# Patient Record
Sex: FEMALE | Race: BLACK OR AFRICAN AMERICAN | NOT HISPANIC OR LATINO | URBAN - METROPOLITAN AREA
[De-identification: names, ages, dates, MRNs, and addresses within clinical notes are randomized per-mention and may not be internally consistent; named-entity substitution may affect disease eponyms.]

---

## 2017-04-14 ENCOUNTER — HOSPITAL ENCOUNTER (EMERGENCY)
Facility: OTHER | Age: 42
Discharge: HOME | End: 2017-04-14
Attending: EMERGENCY MEDICINE
Payer: COMMERCIAL

## 2017-04-14 VITALS
BODY MASS INDEX: 39.65 KG/M2 | TEMPERATURE: 98 F | HEIGHT: 61 IN | HEART RATE: 74 BPM | DIASTOLIC BLOOD PRESSURE: 70 MMHG | OXYGEN SATURATION: 95 % | SYSTOLIC BLOOD PRESSURE: 119 MMHG | RESPIRATION RATE: 14 BRPM | WEIGHT: 210 LBS

## 2017-04-14 DIAGNOSIS — M25.562 ACUTE PAIN OF LEFT KNEE: Primary | ICD-10-CM

## 2017-04-14 LAB
B-HCG UR QL: NEGATIVE
CTP QC/QA: YES

## 2017-04-14 PROCEDURE — 99283 EMERGENCY DEPT VISIT LOW MDM: CPT | Mod: 25

## 2017-04-14 PROCEDURE — 81025 URINE PREGNANCY TEST: CPT | Performed by: EMERGENCY MEDICINE

## 2017-04-14 PROCEDURE — 96372 THER/PROPH/DIAG INJ SC/IM: CPT

## 2017-04-14 PROCEDURE — 63600175 PHARM REV CODE 636 W HCPCS: Performed by: EMERGENCY MEDICINE

## 2017-04-14 RX ORDER — KETOROLAC TROMETHAMINE 30 MG/ML
60 INJECTION, SOLUTION INTRAMUSCULAR; INTRAVENOUS
Status: COMPLETED | OUTPATIENT
Start: 2017-04-14 | End: 2017-04-14

## 2017-04-14 RX ORDER — OXAPROZIN 600 MG/1
1200 TABLET, FILM COATED ORAL DAILY
Qty: 14 TABLET | Refills: 0 | Status: SHIPPED | OUTPATIENT
Start: 2017-04-14 | End: 2017-04-21

## 2017-04-14 RX ADMIN — KETOROLAC TROMETHAMINE 60 MG: 30 INJECTION, SOLUTION INTRAMUSCULAR at 08:04

## 2017-04-14 NOTE — ED AVS SNAPSHOT
[facility]  [address]  [facility] [address]               Alexia Fraire   2017  7:39 PM   ED    Description:  Female : 1975   Department:  [facility]           Your Care was Coordinated By:     Provider Role From To    Jessica Ramirez MD Attending Provider 17 1940 --      Reason for Visit     Leg Pain           Diagnoses this Visit        Comments    Acute pain of left knee    -  Primary       ED Disposition     None           To Do List           Follow-up Information     Follow up with [facility]. Schedule an appointment as soon as possible for a visit in 1 week.    Specialty:  Internal Medicine    Why:  if pain persists    Contact information:    [address]  [facility] [address] [phone]    Additional information:    [facility], 8th Floor, [address]   Please park in [facility].       These Medications        Disp Refills Start End    oxaprozin (DAYPRO) 600 mg tablet 14 tablet 0 2017    Take 2 tablets (1,200 mg total) by mouth once daily. - Oral      [facility]     [facility] Nurse Care Line -  Assistance  Unless otherwise directed by your provider, please contact [facility], our nurse care line that is available for  assistance.     Registered nurses in the [facility] provide: appointment scheduling, clinical advisement, health education, and other advisory services.  Call: [phone] (toll free)               Medications           Message regarding Medications     Verify the changes and/or additions to your medication regime listed below are the same as discussed with your clinician today.  If any of these changes or additions are incorrect, please notify your healthcare provider.        START taking these NEW medications        Refills    oxaprozin (DAYPRO) 600 mg tablet 0    Sig: Take 2 tablets (1,200 mg  "total) by mouth once daily.    Class: Print    Route: Oral      These medications were administered today        Dose Freq    ketorolac injection 60 mg 60 mg ED 1 Time    Sig: Inject 60 mg into the muscle ED 1 Time.    Class: Normal    Route: Intramuscular           Verify that the below list of medications is an accurate representation of the medications you are currently taking.  If none reported, the list may be blank. If incorrect, please contact your healthcare provider. Carry this list with you in case of emergency.           Current Medications     acetaminophen (TYLENOL) 500 MG tablet Take 500 mg by mouth once daily.    ketorolac injection 60 mg Inject 60 mg into the muscle ED 1 Time.    oxaprozin (DAYPRO) 600 mg tablet Take 2 tablets (1,200 mg total) by mouth once daily.           Clinical Reference Information           Your Vitals Were     BP Pulse Temp Resp Height Weight    127/80 79 97.9 °F (36.6 °C) (Oral) 18 5' 1" (1.549 m) 95.3 kg (210 lb)    Last Period SpO2 BMI          03/22/2017 99% 39.68 kg/m2        Allergies as of 4/14/2017     No Known Allergies      Immunizations Administered on Date of Encounter - 4/14/2017     None      ED Micro, Lab, POCT     Start Ordered       Status Ordering Provider    04/14/17 1918 04/14/17 1918  POCT urine pregnancy  Once      Final result       ED Imaging Orders     None        Discharge Instructions         Reducing Knee Pain and Swelling    Many treatments can help reduce pain and swelling in your knee. Your healthcare provider or physical therapist may suggest one or more of the following treatments:  · Icing your knee helps reduce swelling. You may be asked to ice your knee once a day or more. Apply ice for about 15 to 20 minutes at a time, with at least 40 minutes between sessions. Always keep a towel between the ice and your skin.   · Keeping your leg raised above your heart helps excess fluid flow out of your knee joint. This reduces swelling.  · Compression " means wrapping an elastic bandage or neoprene sleeve snugly around your knees. This keeps fluid from collecting in your knee joint.  · Electrical stimulation, done by a physical therapist or , can help reduce excess fluid in your knee joint.  · Anti-inflammatory medicines may be prescribed by your healthcare provider. You may take pills or receive injections in your knee.  · Isometric (christopher) exercises strengthen the muscles that support your knee joint. They also help reduce excess fluid in your knee.  · Massage helps fluid drain away from your knee.  Date Last Reviewed: 10/13/2015  © 5215-5068 [employer]. [address] [place] [address]. All rights reserved. This information is not intended as a substitute for professional medical care. Always follow your healthcare professional's instructions.          Knee Pain of Uncertain Cause    There are several common causes for knee pain. These can include:  · A sprain of the ligaments that support the joint  · An injury to the cartilage lining of the joint  · Arthritis from wear-and-tear or inflammation  There are other causes as well. There may also be swelling, reduced movement of the knee joint, and pain with walking. A definite diagnosis will still need to be made. If your symptoms do not improve, further follow-up and testing may be needed.  Home care  · Stay off the injured leg as much as possible until pain improves.  · Apply an ice pack over the injured area for 15 to 20 minutes every 3 to 6 hours. You should do this for the first 24 to 48 hours. You can make an ice pack by filling a plastic bag that seals at the top with ice cubes and then wrapping it with a thin towel. Continue to use ice packs for relief of pain and swelling as needed. As the ice melts, be careful to avoid getting your wrap, splint, or cast wet. After 48 hours, apply heat (warm shower or warm bath) for 15 to 20 minutes several times a day, or  alternate ice and heat. If you have to wear a hook-and-loop knee brace, you can open it to apply the ice pack, or heat, directly to the knee. Never put ice directly on the skin. Always wrap the ice in a towel or other type of cloth.  · You may use over-the-counter pain medicine to control pain, unless another pain medicine was prescribed. If you have chronic liver or kidney disease or ever had a stomach ulcer or GI bleeding, talk with your healthcare provider using these medicines.  · If crutches or a walker have been recommended, do not put weight on the injured leg until you can do so without pain. Check with your healthcare provider before returning to sports or full work duties.  · If you have a hook-and-loop knee brace, you can remove it to bathe and sleep, unless told otherwise.  Follow-up care  Follow up with your healthcare provider as advised. This is usually within 1-2 weeks.  If X-rays were taken, you will be told of any new findings that may affect your care.  Call 911  Call 911 if you have:  · Shortness of breath  · Chest pain  When to seek medical advice  Call your healthcare provider right away if any of these occur:  · Toes or foot becomes swollen, cold, blue, numb, or tingly  · Pain or swelling spreads over the knee or calf  · Warmth or redness appears over the knee or calf  · Other joints become painful  · Rash appears  · Fever of 100.4°F (38°C) or above lasting for 24 to 48 hours  Date Last Reviewed: 11/23/2015  © 0790-9917 [employer]. [address] [place] [address], [place] [address]. All rights reserved. This information is not intended as a substitute for professional medical care. Always follow your healthcare professional's instructions.          [facility] Sign-Up     Activating your [facility] account is as easy as 1-2-3!     1) Visit [email or web address], select Sign Up Now, enter this activation code and your date of birth, then select Next.  [email or web address]  Expires: 5/29/2017  7:51 PM       2) Create a username and password to use when you visit [facility] in the future and select a security question in case you lose your password and select Next.    3) Enter your e-mail address and click Sign Up!    Additional Information  If you have questions, please e-mail [email or web address] or call [phone] to talk to our [facility] staff. Remember, [facility] is NOT to be used for urgent needs. For medical emergencies, dial 911.          [facility] complies with applicable Federal civil rights laws and does not discriminate on the basis of race, color, national origin, age, disability, or sex.        Language Assistance Services     ATTENTION: Language assistance services are available, free of charge. Please call [phone].      ATENCIÓN: Si habla aisha, tiene a khan disposición servicios gratuitos de asistencia lingüística. Llame al [phone].     CHÚ Ý: N?u b?n nói Ti?ng Vi?t, có các d?ch v? h? tr? ngôn ng? mi?n phí dành cho b?n. G?i s? [phone].

## 2017-04-15 NOTE — DISCHARGE INSTRUCTIONS
Reducing Knee Pain and Swelling    Many treatments can help reduce pain and swelling in your knee. Your healthcare provider or physical therapist may suggest one or more of the following treatments:  · Icing your knee helps reduce swelling. You may be asked to ice your knee once a day or more. Apply ice for about 15 to 20 minutes at a time, with at least 40 minutes between sessions. Always keep a towel between the ice and your skin.   · Keeping your leg raised above your heart helps excess fluid flow out of your knee joint. This reduces swelling.  · Compression means wrapping an elastic bandage or neoprene sleeve snugly around your knees. This keeps fluid from collecting in your knee joint.  · Electrical stimulation, done by a physical therapist or , can help reduce excess fluid in your knee joint.  · Anti-inflammatory medicines may be prescribed by your healthcare provider. You may take pills or receive injections in your knee.  · Isometric (christopher) exercises strengthen the muscles that support your knee joint. They also help reduce excess fluid in your knee.  · Massage helps fluid drain away from your knee.  Date Last Reviewed: 10/13/2015  © 0406-6888 [employer]. [address] [place] [address]. All rights reserved. This information is not intended as a substitute for professional medical care. Always follow your healthcare professional's instructions.          Knee Pain of Uncertain Cause    There are several common causes for knee pain. These can include:  · A sprain of the ligaments that support the joint  · An injury to the cartilage lining of the joint  · Arthritis from wear-and-tear or inflammation  There are other causes as well. There may also be swelling, reduced movement of the knee joint, and pain with walking. A definite diagnosis will still need to be made. If your symptoms do not improve, further follow-up and testing may be needed.  Home care  · Stay  off the injured leg as much as possible until pain improves.  · Apply an ice pack over the injured area for 15 to 20 minutes every 3 to 6 hours. You should do this for the first 24 to 48 hours. You can make an ice pack by filling a plastic bag that seals at the top with ice cubes and then wrapping it with a thin towel. Continue to use ice packs for relief of pain and swelling as needed. As the ice melts, be careful to avoid getting your wrap, splint, or cast wet. After 48 hours, apply heat (warm shower or warm bath) for 15 to 20 minutes several times a day, or alternate ice and heat. If you have to wear a hook-and-loop knee brace, you can open it to apply the ice pack, or heat, directly to the knee. Never put ice directly on the skin. Always wrap the ice in a towel or other type of cloth.  · You may use over-the-counter pain medicine to control pain, unless another pain medicine was prescribed. If you have chronic liver or kidney disease or ever had a stomach ulcer or GI bleeding, talk with your healthcare provider using these medicines.  · If crutches or a walker have been recommended, do not put weight on the injured leg until you can do so without pain. Check with your healthcare provider before returning to sports or full work duties.  · If you have a hook-and-loop knee brace, you can remove it to bathe and sleep, unless told otherwise.  Follow-up care  Follow up with your healthcare provider as advised. This is usually within 1-2 weeks.  If X-rays were taken, you will be told of any new findings that may affect your care.  Call 911  Call 911 if you have:  · Shortness of breath  · Chest pain  When to seek medical advice  Call your healthcare provider right away if any of these occur:  · Toes or foot becomes swollen, cold, blue, numb, or tingly  · Pain or swelling spreads over the knee or calf  · Warmth or redness appears over the knee or calf  · Other joints become painful  · Rash appears  · Fever of 100.4°F  (38°C) or above lasting for 24 to 48 hours  Date Last Reviewed: 11/23/2015  © 8224-1152 [employer]. [address] [place] [address], [place] [address]. All rights reserved. This information is not intended as a substitute for professional medical care. Always follow your healthcare professional's instructions.

## 2017-04-15 NOTE — ED TRIAGE NOTES
Pt reports to ED c/o 9/10 constant left knee pain x 1 week radiating to LLE unrelieved with advil and aleve; denies recent fall or trauma. Pt reports pain worsening with movement and ambulation, ROM intact, pt ambulatory to intake room. Denies numbness/tingling, fevers, chills, N/V/D, dysuria/hematuria, no swelling noted.

## 2017-04-15 NOTE — ED PROVIDER NOTES
"Encounter Date: 2017    SCRIBE #1 NOTE: I, Yenifer Cobian, am scribing for, and in the presence of,  Dr. Ramirez. I have scribed the entire note.       History     Chief Complaint   Patient presents with    Leg Pain     c/o left leg pain x 2 days. Pt states "It hurts 'cause I work". "My knee been hurting me a long time but it been getting worse." Denies trauma     Review of patient's allergies indicates:  No Known Allergies  HPI Comments: Time seen by provider: 7:45 PM    This is a 41 y.o. female who presents with complaint of left knee pain. She reports onset of symptoms was about 1 week ago. The patient describes the pain as shooting and constant that radiates down the leg. She denies any associated swelling, redness, open wounds, numbness, tingling or weakness in the legs. The patient states the pain worsens with staying still. She denies any recent trauma or injury to the knee. 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 She states she has been using Aleve with no improvement in symptoms.    The history is provided by the patient.     History reviewed. No pertinent past medical history.  Past Surgical History:   Procedure Laterality Date     SECTION, LOW TRANSVERSE       History reviewed. No pertinent family history.  Social History   Substance Use Topics    Smoking status: Never Smoker    Smokeless tobacco: None    Alcohol use No     Review of Systems   Constitutional: Negative for chills and fever.   HENT: Negative for congestion and sore throat.    Eyes: Negative for redness and visual disturbance.   Respiratory: Negative for cough and shortness of breath.    Cardiovascular: Negative for chest pain and palpitations.   Gastrointestinal: Negative for abdominal pain, diarrhea, nausea and vomiting.   Genitourinary: Negative for dysuria.   Musculoskeletal: Negative for back pain.        Left knee pain   Skin: Negative for rash.   Neurological: Negative for weakness " and headaches.   Psychiatric/Behavioral: Negative for confusion.       Physical Exam   Initial Vitals   BP Pulse Resp Temp SpO2   04/14/17 1915 04/14/17 1915 04/14/17 1915 04/14/17 1915 04/14/17 1915   127/80 79 18 97.9 °F (36.6 °C) 99 %     Physical Exam    Nursing note and vitals reviewed.  Constitutional: She appears well-developed and well-nourished. She is not diaphoretic. No distress.   HENT:   Head: Normocephalic and atraumatic.   Right Ear: External ear normal.   Left Ear: External ear normal.   Eyes: Conjunctivae and EOM are normal.   Neck: Normal range of motion. Neck supple. No JVD present.   Cardiovascular: Normal rate, regular rhythm and normal heart sounds. Exam reveals no gallop and no friction rub.    No murmur heard.  Pulmonary/Chest: Breath sounds normal. She has no wheezes. She has no rhonchi. She has no rales.   Musculoskeletal: Normal range of motion. She exhibits no edema or tenderness.   LLE: Small anterior patellar effusion. No bony tenderness to palpation. No erythema or warmth.    Neurological: She is alert and oriented to person, place, and time. She has normal strength.   Skin: Skin is warm and dry. No rash noted.         ED Course   Procedures  Labs Reviewed   POCT URINE PREGNANCY                Additional MDM:   Comments: 42 y/o female with c/o atraumatic left knee pain.  Small patellar effusion on exam.  Pt counseled on RICE and was given a RX for daypro and instructed to f/u with her PCP in 1 week for re-evaluation..          Scribe Attestation:   Scribe #1: I performed the above scribed service and the documentation accurately describes the services I performed. I attest to the accuracy of the note.    Attending Attestation:           Physician Attestation for Scribe:  Physician Attestation Statement for Scribe #1: I, Dr. Ramirez, reviewed documentation, as scribed by Yenifer Cobian in my presence, and it is both accurate and complete.                 ED Course     Clinical  Impression:     1. Acute pain of left knee              Jessica Ramirez MD  04/14/17 8932

## 2018-06-26 ENCOUNTER — CLINICAL SUPPORT (OUTPATIENT)
Dept: URGENT CARE | Facility: CLINIC | Age: 43
End: 2018-06-26

## 2018-06-26 DIAGNOSIS — Z02.1 PHYSICAL EXAM, PRE-EMPLOYMENT: Primary | ICD-10-CM

## 2018-06-26 PROCEDURE — 99499 UNLISTED E&M SERVICE: CPT | Mod: S$GLB,,, | Performed by: PREVENTIVE MEDICINE

## 2018-06-26 PROCEDURE — 80305 DRUG TEST PRSMV DIR OPT OBS: CPT | Mod: S$GLB,,, | Performed by: PREVENTIVE MEDICINE

## 2018-10-05 ENCOUNTER — OFFICE VISIT (OUTPATIENT)
Dept: URGENT CARE | Facility: CLINIC | Age: 43
End: 2018-10-05
Payer: OTHER MISCELLANEOUS

## 2018-10-05 ENCOUNTER — TELEPHONE (OUTPATIENT)
Dept: URGENT CARE | Facility: CLINIC | Age: 43
End: 2018-10-05

## 2018-10-05 VITALS
OXYGEN SATURATION: 100 % | DIASTOLIC BLOOD PRESSURE: 85 MMHG | BODY MASS INDEX: 41.54 KG/M2 | TEMPERATURE: 98 F | SYSTOLIC BLOOD PRESSURE: 114 MMHG | WEIGHT: 220 LBS | RESPIRATION RATE: 18 BRPM | HEIGHT: 61 IN | HEART RATE: 67 BPM
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
## 2018-10-05 LAB
CTP QC/QA: YES
POC 5 PANEL DRUG SCREEN: NEGATIVE

## 2018-10-05 PROCEDURE — 80305 DRUG TEST PRSMV DIR OPT OBS: CPT | Mod: QW,S$GLB,, | Performed by: NURSE PRACTITIONER

## 2018-10-05 PROCEDURE — 99203 OFFICE O/P NEW LOW 30 MIN: CPT | Mod: S$GLB,,, | Performed by: NURSE PRACTITIONER

## 2018-10-05 NOTE — PROGRESS NOTES
"Subjective:       Patient ID: Alexia Fraire is a 42 y.o. female.    Vitals:  height is 5' 1" (1.549 m) and weight is 99.8 kg (220 lb). Her temperature is 98.3 °F (36.8 °C). Her blood pressure is 114/85 and her pulse is 67. Her respiration is 18 and oxygen saturation is 100%.     Chief Complaint: Fall    Patient presents with c/o slip and fall on marble floor while walking in to room to clean.  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  She has no complaints.  States that this occurred on 9/29/18 and that she was instructed by her work to get cleared to return.  She has no neck or back pain.  She did not hit her head or pass out.  Denies numbness, tingling, weakness, or loss of bowel/bladder control.  No headache or dizziness.  She reports that she did not miss any work s/t to this incident.      Fall   The accident occurred 5 to 7 days ago. The fall occurred while walking. She fell from a height of 3 to 5 ft. She landed on hard floor. There was no blood loss. The point of impact was the right shoulder and right hip. The pain is at a severity of 0/10. The patient is experiencing no pain. Exacerbated by: nothing  Pertinent negatives include no abdominal pain, fever, headaches, nausea or vomiting. She has tried nothing for the symptoms.     Review of Systems   Constitution: Negative for chills and fever.   HENT: Negative for sore throat.    Eyes: Negative for blurred vision.   Cardiovascular: Negative for chest pain.   Respiratory: Negative for shortness of breath.    Skin: Negative for rash.   Musculoskeletal: Positive for falls. Negative for back pain, joint pain, neck pain and stiffness.   Gastrointestinal: Negative for abdominal pain, diarrhea, nausea and vomiting.   Neurological: Negative for dizziness and headaches.   Psychiatric/Behavioral: The patient is not nervous/anxious.        Objective:      Physical Exam   Constitutional: She is oriented to person, place, and time. Vital signs are " normal. She appears well-developed and well-nourished. She is active and cooperative. No distress.   HENT:   Head: Normocephalic and atraumatic.   Nose: Nose normal.   Mouth/Throat: Oropharynx is clear and moist and mucous membranes are normal.   Eyes: Conjunctivae, EOM and lids are normal. Pupils are equal, round, and reactive to light.   Neck: Trachea normal, normal range of motion, full passive range of motion without pain and phonation normal. Neck supple. No spinous process tenderness and no muscular tenderness present. Normal range of motion present.   Cardiovascular: Normal rate, regular rhythm, normal heart sounds, intact distal pulses and normal pulses.   Pulmonary/Chest: Effort normal and breath sounds normal.   Abdominal: Soft. Normal appearance and bowel sounds are normal. She exhibits no abdominal bruit, no pulsatile midline mass and no mass.   Musculoskeletal: She exhibits no edema or deformity.        Right shoulder: Normal.        Right hip: Normal.        Cervical back: Normal.        Thoracic back: Normal.        Lumbar back: Normal.   Neurological: She is alert and oriented to person, place, and time. She has normal strength and normal reflexes. She displays no atrophy and no tremor. No cranial nerve deficit or sensory deficit. She exhibits normal muscle tone. She displays a negative Romberg sign. Coordination and gait normal.   Skin: Skin is warm, dry and intact. She is not diaphoretic.   Psychiatric: She has a normal mood and affect. Her speech is normal and behavior is normal. Judgment and thought content normal. Cognition and memory are normal.   Nursing note and vitals reviewed.      Assessment:       1. Fall, initial encounter        Plan:         Fall, initial encounter  -     POCT Rapid Drug Screen 5 Panel      Patient Instructions     You are cleared to return to work.  Follow up at one of our [facility] facilities if you begin to have any symptoms.    Preventing Falls: How to Prepare and  What to Do    Falling is not something you want to think about. But it can make a big difference to plan ahead. If you're prepared, you'll know how to get help. And you'll be less likely to panic if you fall. This means you'll be able to do what's needed to get help right away.  How to prepare  · Have someone check on you daily.  · Keep a list of emergency numbers near the phone.  · Always have a way to call for help. Keep a cell phone with you at all times. Or talk with your healthcare provider about how to set up a home monitoring service. This involves wearing a small device around your neck or wrist. If you fall, you can press the button on the device. This alerts emergency responders.  · Talk with your healthcare provider about an exercise program that's right for you. Regular exercise may reduce the risk of falling and the risk for injury related to a fall.  · It's important to have good lighting in your home. Avoid using throw rugs, because they can raise your risk of tripping and falling. Add grab bars in the bathroom to help reduce the risk of falling. Small changes can make your home safer. Talk with your healthcare provider about making your home safer.  What to do if you fall  Above all, try to stay calm:  · If you start to fall, try to relax your body. This will reduce the impact of the fall.  · After you fall, press your monitor button, or phone for help.  · Don't rush to get up. First, make sure you're not hurt.  · Roll onto your side, then crawl to a chair. Pull yourself up onto the chair slowly.  · You should be checked if you struck your head, lost consciousness, were confused afterward, or have any other concerns for injury.  · Be sure to tell your doctor that you fell.  A note to family and friends  If you're with a loved one when he or she starts to fall, don't try to stop the fall. Ease the person to the floor carefully, so neither of you gets hurt. Don't leave the person alone. And don't try  to move him or her. Put a pillow under his or her head. Check for injuries. If help is needed right away, be sure to call 911.   Date Last Reviewed: 6/13/2015  © 6100-1593 [employer]. [address] [place] [address], [place] [address]. All rights reserved. This information is not intended as a substitute for professional medical care. Always follow your healthcare professional's instructions.

## 2018-10-05 NOTE — PATIENT INSTRUCTIONS
You are cleared to return to work.  Follow up at one of our [facility] facilities if you begin to have any symptoms.    Preventing Falls: How to Prepare and What to Do    Falling is not something you want to think about. But it can make a big difference to plan ahead. If you're prepared, you'll know how to get help. And you'll be less likely to panic if you fall. This means you'll be able to do what's needed to get help right away.  How to prepare  · Have someone check on you daily.  · Keep a list of emergency numbers near the phone.  · Always have a way to call for help. Keep a cell phone with you at all times. Or talk with your healthcare provider about how to set up a home monitoring service. This involves wearing a small device around your neck or wrist. If you fall, you can press the button on the device. This alerts emergency responders.  · Talk with your healthcare provider about an exercise program that's right for you. Regular exercise may reduce the risk of falling and the risk for injury related to a fall.  · It's important to have good lighting in your home. Avoid using throw rugs, because they can raise your risk of tripping and falling. Add grab bars in the bathroom to help reduce the risk of falling. Small changes can make your home safer. Talk with your healthcare provider about making your home safer.  What to do if you fall  Above all, try to stay calm:  · If you start to fall, try to relax your body. This will reduce the impact of the fall.  · After you fall, press your monitor button, or phone for help.  · Don't rush to get up. First, make sure you're not hurt.  · Roll onto your side, then crawl to a chair. Pull yourself up onto the chair slowly.  · You should be checked if you struck your head, lost consciousness, were confused afterward, or have any other concerns for injury.  · Be sure to tell your doctor that you fell.  A note to family and friends  If you're with a loved one when he or she  starts to fall, don't try to stop the fall. Ease the person to the floor carefully, so neither of you gets hurt. Don't leave the person alone. And don't try to move him or her. Put a pillow under his or her head. Check for injuries. If help is needed right away, be sure to call 911.   Date Last Reviewed: 6/13/2015  © 1822-7860 [employer]. [address] [place] [address], [place] [address]. All rights reserved. This information is not intended as a substitute for professional medical care. Always follow your healthcare professional's instructions.

## 2019-10-11 ENCOUNTER — HOSPITAL ENCOUNTER (EMERGENCY)
Facility: OTHER | Age: 44
Discharge: HOME | End: 2019-10-11
Attending: EMERGENCY MEDICINE

## 2019-10-11 VITALS
TEMPERATURE: 98 F | DIASTOLIC BLOOD PRESSURE: 82 MMHG | BODY MASS INDEX: 38.67 KG/M2 | OXYGEN SATURATION: 97 % | HEIGHT: 61 IN | HEART RATE: 96 BPM | SYSTOLIC BLOOD PRESSURE: 135 MMHG | WEIGHT: 204.81 LBS | RESPIRATION RATE: 16 BRPM

## 2019-10-11 DIAGNOSIS — S39.012A STRAIN OF LUMBAR REGION, INITIAL ENCOUNTER: ICD-10-CM

## 2019-10-11 DIAGNOSIS — T14.8XXA MUSCLE CONTUSION: Primary | ICD-10-CM

## 2019-10-11 DIAGNOSIS — S20.229A CONTUSION OF BACK, UNSPECIFIED LATERALITY, INITIAL ENCOUNTER: ICD-10-CM

## 2019-10-11 LAB
B-HCG UR QL: NEGATIVE
CTP QC/QA: YES

## 2019-10-11 PROCEDURE — 81025 URINE PREGNANCY TEST: CPT | Performed by: EMERGENCY MEDICINE

## 2019-10-11 PROCEDURE — 99283 EMERGENCY DEPT VISIT LOW MDM: CPT

## 2019-10-11 RX ORDER — METHYLPREDNISOLONE 4 MG/1
4 TABLET ORAL
COMMUNITY
End: 2022-09-11

## 2019-10-11 RX ORDER — METHOCARBAMOL 500 MG/1
500 TABLET, FILM COATED ORAL 3 TIMES DAILY
Qty: 15 TABLET | Refills: 0 | Status: SHIPPED | OUTPATIENT
Start: 2019-10-11 | End: 2019-10-16

## 2019-10-11 RX ORDER — HYDROCODONE BITARTRATE AND ACETAMINOPHEN 5; 325 MG/1; MG/1
1 TABLET ORAL EVERY 6 HOURS PRN
COMMUNITY

## 2019-10-11 RX ORDER — AMOXICILLIN 500 MG/1
500 CAPSULE ORAL
COMMUNITY
End: 2022-09-11

## 2019-10-12 NOTE — DISCHARGE INSTRUCTIONS
Please do not take hydrocodone and robaxin at the same time  Do not operate heavy machinery (Car, golf cart, truck, or any motorized vehicle) while taking robaxin  Warm compresses to sore muscles to help with pain

## 2019-10-12 NOTE — ED TRIAGE NOTES
Bilateral arm pain after falling Thursday. Pt is also c/o an abscess on the right side of her mouth

## 2019-10-12 NOTE — ED PROVIDER NOTES
Encounter Date: 10/11/2019       History     Chief Complaint   Patient presents with    Fall     x 2 days ago, c/o bilat arm and mid- back pain     43-year-old female which presents to the emergency room with bilateral arm pain and back pain which began yesterday after she fell while at work.  Patient states that she has been taking antibiotics and pain medication for an abscess to her to which she states made her fall.  Per patient she is able to move all extremities without difficulty.  She has not taken any medication for the pain except for her prescribed steroids and hydrocodone.  Patient denies any loss of urine or bowel.  She denies any weakness, numbness or tingling to legs.    The history is provided by the patient.     Review of patient's allergies indicates:  No Known Allergies  History reviewed. No pertinent past medical history.  Past Surgical History:   Procedure Laterality Date     SECTION, LOW TRANSVERSE       No family history on file.  Social History     Tobacco Use    Smoking status: Never Smoker    Smokeless tobacco: Never Used   Substance Use Topics    Alcohol use: No    Drug use: No     Review of Systems   Constitutional: Negative for fever.   HENT: Negative for sore throat.    Respiratory: Negative for shortness of breath.    Cardiovascular: Negative for chest pain.   Gastrointestinal: Negative for nausea.   Genitourinary: Negative for dysuria.   Musculoskeletal: Positive for back pain and myalgias.   Skin: Negative for rash.   Neurological: Negative for weakness.   Hematological: Does not bruise/bleed easily.   All other systems reviewed and are negative.    Physical Exam     Initial Vitals [10/11/19 2145]   BP Pulse Resp Temp SpO2   135/82 96 16 98.1 °F (36.7 °C) 97 %      MAP       --         Physical Exam    Nursing note and vitals reviewed.  Constitutional: She appears well-developed and well-nourished.   HENT:   Head: Normocephalic and atraumatic.   Eyes: Conjunctivae and  EOM are normal. Pupils are equal, round, and reactive to light.   Cardiovascular: Normal rate, regular rhythm, normal heart sounds and intact distal pulses. Exam reveals no gallop and no friction rub.    No murmur heard.  Pulmonary/Chest: Breath sounds normal. No respiratory distress. She has no wheezes. She has no rhonchi. She has no rales. She exhibits no tenderness.   Musculoskeletal: Normal range of motion. She exhibits tenderness. She exhibits no edema.        Back:    No pain with movement or palpation to bilateral arms; muscular pain noted to bilateral areas of mid to lower back   Neurological: She is alert and oriented to person, place, and time. She has normal strength. GCS score is 15. GCS eye subscore is 4. GCS verbal subscore is 5. GCS motor subscore is 6.       ED Course   Procedures  Labs Reviewed   POCT URINE PREGNANCY           Medical Decision Making:   Initial Assessment:   43-year-old female with presents to emergency room with pain to her bilateral arms and back after she fell at work yesterday.  Patient is not currently experiencing any arm pain. Patient is currently taking steroids and hydrocodone 1 with antibiotics for a recent dental abscess.  Differential Diagnosis:   Muscle contusion, muscle strain  Clinical Tests:   Lab Tests: Ordered and Reviewed  The following lab test(s) were unremarkable: UPT  ED Management:  Patient examined the not had any arm pain. Minimal back pain noted with palpation. No bony tenderness. Patient given Robaxin at discharge and advised follow-up with her primary care as needed. Patient given strict return precautions and voiced understanding of all discharge instructions. Pt was stable at discharge.                        ED Course as of Oct 11 2211   Fri Oct 11, 2019   2154 BP: 135/82 [AT]   2154 Temp: 98.1 °F (36.7 °C) [AT]   2154 Temp src: Oral [AT]   2154 Pulse: 96 [AT]   2154 Resp: 16 [AT]   2154 SpO2: 97 % [AT]   2201 Preg Test, Ur: Negative [AT]      ED  Course User Index  [AT] LAURA Lua     Clinical Impression:       ICD-10-CM ICD-9-CM   1. Muscle contusion T14.8XXA 924.9   2. Contusion of back, unspecified laterality, initial encounter S20.229A 922.31   3. Strain of lumbar region, initial encounter S39.012A 847.2                                LAURA Lua  10/11/19 2226

## 2020-03-02 ENCOUNTER — HOSPITAL ENCOUNTER (EMERGENCY)
Facility: OTHER | Age: 45
Discharge: HOME | End: 2020-03-02
Attending: EMERGENCY MEDICINE
Payer: MEDICAID

## 2020-03-02 VITALS
RESPIRATION RATE: 18 BRPM | DIASTOLIC BLOOD PRESSURE: 88 MMHG | TEMPERATURE: 99 F | WEIGHT: 210 LBS | BODY MASS INDEX: 39.65 KG/M2 | OXYGEN SATURATION: 98 % | HEIGHT: 61 IN | HEART RATE: 97 BPM | SYSTOLIC BLOOD PRESSURE: 135 MMHG

## 2020-03-02 DIAGNOSIS — J20.9 ACUTE BRONCHITIS, UNSPECIFIED ORGANISM: Primary | ICD-10-CM

## 2020-03-02 LAB
B-HCG UR QL: NEGATIVE
CTP QC/QA: YES

## 2020-03-02 PROCEDURE — 81025 URINE PREGNANCY TEST: CPT | Performed by: EMERGENCY MEDICINE

## 2020-03-02 PROCEDURE — 99284 EMERGENCY DEPT VISIT MOD MDM: CPT | Mod: 25

## 2020-03-02 PROCEDURE — 25000003 PHARM REV CODE 250: Performed by: PHYSICIAN ASSISTANT

## 2020-03-02 RX ORDER — IBUPROFEN 600 MG/1
600 TABLET ORAL
Status: COMPLETED | OUTPATIENT
Start: 2020-03-02 | End: 2020-03-02

## 2020-03-02 RX ORDER — IBUPROFEN 600 MG/1
600 TABLET ORAL EVERY 6 HOURS PRN
Qty: 20 TABLET | Refills: 0 | OUTPATIENT
Start: 2020-03-02 | End: 2021-03-05

## 2020-03-02 RX ORDER — BENZONATATE 100 MG/1
100 CAPSULE ORAL 3 TIMES DAILY PRN
Qty: 20 CAPSULE | Refills: 0 | Status: SHIPPED | OUTPATIENT
Start: 2020-03-02 | End: 2020-03-12

## 2020-03-02 RX ADMIN — IBUPROFEN 600 MG: 600 TABLET, FILM COATED ORAL at 01:03

## 2020-03-02 NOTE — ED PROVIDER NOTES
Encounter Date: 3/2/2020       History     Chief Complaint   Patient presents with    Cough     Green Productive Cough and congestion for the past three weeks not relieved with otc meds.     Patient is a 44 y.o. female presenting to the emergency department for evaluation of cough. The patient states her symptoms have been persist net for the past 3 weeks. She reports that at times it is productive of mucous. She also reports congestion, and a sore throat. She denies fever or chills. No known sick contacts. She denies abdominal pain, nasuea, or vomiting. No history of asthma. No smoking. She has tried taking over the counter therafu, nyquil and sara seltzer for her symptoms with minimal relief. Last dose of alkla seltzer was this am. This is the extent of the patient's complaints at this time.       The history is provided by the patient.     Review of patient's allergies indicates:  No Known Allergies  No past medical history on file.  Past Surgical History:   Procedure Laterality Date     SECTION, LOW TRANSVERSE       No family history on file.  Social History     Tobacco Use    Smoking status: Never Smoker    Smokeless tobacco: Never Used   Substance Use Topics    Alcohol use: No    Drug use: No     Review of Systems   Constitutional: Negative for activity change, appetite change, chills, fatigue and fever.   HENT: Positive for congestion and sore throat. Negative for rhinorrhea.    Eyes: Negative for photophobia and visual disturbance.   Respiratory: Positive for cough. Negative for shortness of breath and wheezing.    Cardiovascular: Negative for chest pain.   Gastrointestinal: Negative for abdominal pain, diarrhea, nausea and vomiting.   Genitourinary: Negative for dysuria, hematuria and urgency.   Musculoskeletal: Positive for myalgias. Negative for back pain and neck pain.   Skin: Negative for color change and wound.   Neurological: Negative for weakness and headaches.   Psychiatric/Behavioral:  Negative for agitation and confusion.       Physical Exam     Initial Vitals [03/02/20 1204]   BP Pulse Resp Temp SpO2   (!) 135/95 97 18 98.2 °F (36.8 °C) 97 %      MAP       --         Physical Exam    Nursing note and vitals reviewed.  Constitutional: She appears well-developed and well-nourished. She is not diaphoretic. She is cooperative.  Non-toxic appearance. She does not have a sickly appearance. She does not appear ill. No distress.   Well appearing,  female accompanied by a female and child in the ED. Speaking in clear and full sentences, no acute distress.    HENT:   Head: Normocephalic and atraumatic.   Right Ear: Hearing, tympanic membrane, external ear and ear canal normal.   Left Ear: Hearing, tympanic membrane, external ear and ear canal normal.   Nose: Mucosal edema present.   Mouth/Throat: Uvula is midline, oropharynx is clear and moist and mucous membranes are normal.   Eyes: Conjunctivae and EOM are normal.   Neck: Normal range of motion. Neck supple.   Cardiovascular: Normal rate, regular rhythm and normal heart sounds.   Pulmonary/Chest: Breath sounds normal. No respiratory distress. She has no wheezes.   Musculoskeletal: Normal range of motion.   Neurological: She is alert and oriented to person, place, and time. GCS eye subscore is 4. GCS verbal subscore is 5. GCS motor subscore is 6.   Skin: Skin is warm.   Psychiatric: She has a normal mood and affect. Her behavior is normal. Judgment and thought content normal.         ED Course   Procedures  Labs Reviewed   POCT URINE PREGNANCY          Imaging Results          X-Ray Chest PA And Lateral (Final result)  Result time 03/02/20 13:22:54    Final result by Compa Fernando DO (03/02/20 13:22:54)                 Impression:      See above      Electronically signed by: Compa Fernando DO  Date:    03/02/2020  Time:    13:22             Narrative:    EXAMINATION:  XR CHEST PA AND LATERAL    CLINICAL  HISTORY:  Cough    TECHNIQUE:  PA and lateral views of the chest were performed.    COMPARISON:  07/14/2014    FINDINGS:  Continued slight nonspecific elevation of the right lung base.  No new lung opacity.  No focal lung consolidation..  No pleural effusion or pneumothorax.  Heart size within normal limits.  Visualized osseous structures grossly intact.                              X-Rays:   Independently Interpreted Readings:   Chest X-Ray: Normal heart size.  No infiltrates.  No acute abnormalities.     Medical Decision Making:   Initial Assessment:     Urgent evaluation of a 44 y.o. female presenting to the emergency department complaining of cough x3 weeks. Patient is afebrile, nontoxic appearing and hemodynamically stable. Physical exam reveals boggy nasal mucosa, lungs are clear to auscultation bilaterally.  Vital signs are unremarkable. Will plan for chest x-ray and reassess.      Independently Interpreted Test(s):   I have ordered and independently interpreted X-rays - see prior notes.  Clinical Tests:   Lab Tests: Reviewed and Ordered  Radiological Study: Ordered and Reviewed  ED Management:    UPT is negative. Chest x-ray does not show any evidence of acute pneumonia.  Signs symptoms likely secondary to a viral etiology.  Given patient's history of physical exam findings, do not feel antibiotics are warranted at this time.  Will instead treat symptomatically with ibuprofen and Tessalon Perles.  Counseled home care treatment.  Discharged stable condition. The patient was instructed to follow up with a primary care provider in 2 days or to return to the emergency department for worsening symptoms. The treatment plan was discussed with the patient who demonstrated understanding and comfort with plan.      This note was created using [employer] Fluency Direct. There may be typographical errors secondary to dictation.                                    Clinical Impression:     1. Acute bronchitis, unspecified  organism         Disposition:   Disposition: Discharged  Condition: Stable                     Gabriella Henao PA-C  03/02/20 1342

## 2021-03-05 ENCOUNTER — HOSPITAL ENCOUNTER (EMERGENCY)
Facility: OTHER | Age: 46
Discharge: HOME | End: 2021-03-05
Attending: EMERGENCY MEDICINE
Payer: MEDICAID

## 2021-03-05 VITALS
HEART RATE: 98 BPM | DIASTOLIC BLOOD PRESSURE: 86 MMHG | SYSTOLIC BLOOD PRESSURE: 139 MMHG | OXYGEN SATURATION: 99 % | BODY MASS INDEX: 39.65 KG/M2 | WEIGHT: 210 LBS | HEIGHT: 61 IN | TEMPERATURE: 97 F | RESPIRATION RATE: 18 BRPM

## 2021-03-05 DIAGNOSIS — G89.29 ACUTE EXACERBATION OF CHRONIC LOW BACK PAIN: Primary | ICD-10-CM

## 2021-03-05 DIAGNOSIS — M54.50 ACUTE EXACERBATION OF CHRONIC LOW BACK PAIN: Primary | ICD-10-CM

## 2021-03-05 LAB
B-HCG UR QL: NEGATIVE
CTP QC/QA: YES

## 2021-03-05 PROCEDURE — 99284 EMERGENCY DEPT VISIT MOD MDM: CPT | Mod: 25

## 2021-03-05 PROCEDURE — 63600175 PHARM REV CODE 636 W HCPCS: Performed by: EMERGENCY MEDICINE

## 2021-03-05 PROCEDURE — 81025 URINE PREGNANCY TEST: CPT | Performed by: EMERGENCY MEDICINE

## 2021-03-05 PROCEDURE — 96372 THER/PROPH/DIAG INJ SC/IM: CPT

## 2021-03-05 RX ORDER — KETOROLAC TROMETHAMINE 30 MG/ML
30 INJECTION, SOLUTION INTRAMUSCULAR; INTRAVENOUS
Status: COMPLETED | OUTPATIENT
Start: 2021-03-05 | End: 2021-03-05

## 2021-03-05 RX ORDER — METHOCARBAMOL 500 MG/1
1000 TABLET, FILM COATED ORAL 3 TIMES DAILY PRN
Qty: 20 TABLET | Refills: 0 | Status: SHIPPED | OUTPATIENT
Start: 2021-03-05 | End: 2021-03-10

## 2021-03-05 RX ORDER — IBUPROFEN 800 MG/1
800 TABLET ORAL EVERY 6 HOURS PRN
Qty: 30 TABLET | Refills: 0 | OUTPATIENT
Start: 2021-03-05 | End: 2022-09-11

## 2021-03-05 RX ORDER — ORPHENADRINE CITRATE 30 MG/ML
60 INJECTION INTRAMUSCULAR; INTRAVENOUS
Status: COMPLETED | OUTPATIENT
Start: 2021-03-05 | End: 2021-03-05

## 2021-03-05 RX ADMIN — KETOROLAC TROMETHAMINE 30 MG: 30 INJECTION, SOLUTION INTRAMUSCULAR; INTRAVENOUS at 06:03

## 2021-03-05 RX ADMIN — ORPHENADRINE CITRATE 60 MG: 30 INJECTION INTRAMUSCULAR; INTRAVENOUS at 06:03

## 2021-03-16 ENCOUNTER — TELEPHONE (OUTPATIENT)
Dept: ADMINISTRATIVE | Facility: OTHER | Age: 46
End: 2021-03-16

## 2021-07-21 ENCOUNTER — HOSPITAL ENCOUNTER (EMERGENCY)
Facility: OTHER | Age: 46
Discharge: LEFT BEFORE DISCHARGE | End: 2021-07-21
Attending: EMERGENCY MEDICINE
Payer: MEDICAID

## 2021-07-21 VITALS
RESPIRATION RATE: 20 BRPM | BODY MASS INDEX: 41.54 KG/M2 | SYSTOLIC BLOOD PRESSURE: 137 MMHG | WEIGHT: 220 LBS | TEMPERATURE: 98 F | OXYGEN SATURATION: 95 % | HEIGHT: 61 IN | HEART RATE: 81 BPM | DIASTOLIC BLOOD PRESSURE: 100 MMHG

## 2021-07-21 DIAGNOSIS — R05.9 COUGH: ICD-10-CM

## 2021-07-21 DIAGNOSIS — B34.9 VIRAL ILLNESS: Primary | ICD-10-CM

## 2021-07-21 DIAGNOSIS — R07.9 CHEST PAIN: ICD-10-CM

## 2021-07-21 LAB
B-HCG UR QL: NEGATIVE
CTP QC/QA: YES
CTP QC/QA: YES
GROUP A STREP, MOLECULAR: NEGATIVE
HCV AB SERPL QL IA: NEGATIVE
HIV 1+2 AB+HIV1 P24 AG SERPL QL IA: NEGATIVE
SARS-COV-2 RDRP RESP QL NAA+PROBE: NEGATIVE

## 2021-07-21 PROCEDURE — 93005 ELECTROCARDIOGRAM TRACING: CPT

## 2021-07-21 PROCEDURE — 81025 URINE PREGNANCY TEST: CPT | Performed by: EMERGENCY MEDICINE

## 2021-07-21 PROCEDURE — 99284 EMERGENCY DEPT VISIT MOD MDM: CPT

## 2021-07-21 PROCEDURE — U0002 COVID-19 LAB TEST NON-CDC: HCPCS | Performed by: EMERGENCY MEDICINE

## 2021-07-21 PROCEDURE — 25000003 PHARM REV CODE 250: Performed by: EMERGENCY MEDICINE

## 2021-07-21 PROCEDURE — 87651 STREP A DNA AMP PROBE: CPT | Performed by: EMERGENCY MEDICINE

## 2021-07-21 PROCEDURE — 93010 ELECTROCARDIOGRAM REPORT: CPT | Mod: ,,, | Performed by: INTERNAL MEDICINE

## 2021-07-21 PROCEDURE — 87389 HIV-1 AG W/HIV-1&-2 AB AG IA: CPT | Performed by: EMERGENCY MEDICINE

## 2021-07-21 PROCEDURE — 86803 HEPATITIS C AB TEST: CPT | Performed by: EMERGENCY MEDICINE

## 2021-07-21 PROCEDURE — 93010 EKG 12-LEAD: ICD-10-PCS | Mod: ,,, | Performed by: INTERNAL MEDICINE

## 2021-07-21 RX ORDER — IBUPROFEN 400 MG/1
800 TABLET ORAL
Status: COMPLETED | OUTPATIENT
Start: 2021-07-21 | End: 2021-07-21

## 2021-07-21 RX ADMIN — IBUPROFEN 800 MG: 400 TABLET ORAL at 05:07

## 2022-09-10 ENCOUNTER — HOSPITAL ENCOUNTER (EMERGENCY)
Facility: OTHER | Age: 47
Discharge: HOME | End: 2022-09-11
Attending: EMERGENCY MEDICINE
Payer: MEDICAID

## 2022-09-10 DIAGNOSIS — R60.0 BILATERAL LEG EDEMA: Primary | ICD-10-CM

## 2022-09-10 PROCEDURE — 99284 EMERGENCY DEPT VISIT MOD MDM: CPT | Mod: 25

## 2022-09-10 RX ORDER — KETOROLAC TROMETHAMINE 30 MG/ML
15 INJECTION, SOLUTION INTRAMUSCULAR; INTRAVENOUS
Status: COMPLETED | OUTPATIENT
Start: 2022-09-11 | End: 2022-09-11

## 2022-09-11 VITALS
TEMPERATURE: 99 F | BODY MASS INDEX: 44.37 KG/M2 | OXYGEN SATURATION: 98 % | HEART RATE: 80 BPM | HEIGHT: 61 IN | DIASTOLIC BLOOD PRESSURE: 70 MMHG | RESPIRATION RATE: 16 BRPM | WEIGHT: 235 LBS | SYSTOLIC BLOOD PRESSURE: 140 MMHG

## 2022-09-11 LAB
ALBUMIN SERPL BCP-MCNC: 3.3 G/DL (ref 3.5–5.2)
ALP SERPL-CCNC: 78 U/L (ref 55–135)
ALT SERPL W/O P-5'-P-CCNC: 13 U/L (ref 10–44)
ANION GAP SERPL CALC-SCNC: 11 MMOL/L (ref 8–16)
AST SERPL-CCNC: 17 U/L (ref 10–40)
BASOPHILS # BLD AUTO: 0.03 K/UL (ref 0–0.2)
BASOPHILS NFR BLD: 0.3 % (ref 0–1.9)
BILIRUB SERPL-MCNC: 0.2 MG/DL (ref 0.1–1)
BNP SERPL-MCNC: <10 PG/ML (ref 0–99)
BUN SERPL-MCNC: 13 MG/DL (ref 6–20)
CALCIUM SERPL-MCNC: 9 MG/DL (ref 8.7–10.5)
CHLORIDE SERPL-SCNC: 107 MMOL/L (ref 95–110)
CO2 SERPL-SCNC: 21 MMOL/L (ref 23–29)
CREAT SERPL-MCNC: 1 MG/DL (ref 0.5–1.4)
D DIMER PPP IA.FEU-MCNC: 0.99 MG/L FEU
DIFFERENTIAL METHOD: ABNORMAL
EOSINOPHIL # BLD AUTO: 0.3 K/UL (ref 0–0.5)
EOSINOPHIL NFR BLD: 2.5 % (ref 0–8)
ERYTHROCYTE [DISTWIDTH] IN BLOOD BY AUTOMATED COUNT: 16.3 % (ref 11.5–14.5)
EST. GFR  (NO RACE VARIABLE): >60 ML/MIN/1.73 M^2
GLUCOSE SERPL-MCNC: 121 MG/DL (ref 70–110)
HCT VFR BLD AUTO: 33.5 % (ref 37–48.5)
HGB BLD-MCNC: 10.8 G/DL (ref 12–16)
IMM GRANULOCYTES # BLD AUTO: 0.03 K/UL (ref 0–0.04)
IMM GRANULOCYTES NFR BLD AUTO: 0.3 % (ref 0–0.5)
LYMPHOCYTES # BLD AUTO: 2.9 K/UL (ref 1–4.8)
LYMPHOCYTES NFR BLD: 27.1 % (ref 18–48)
MCH RBC QN AUTO: 25.9 PG (ref 27–31)
MCHC RBC AUTO-ENTMCNC: 32.2 G/DL (ref 32–36)
MCV RBC AUTO: 80 FL (ref 82–98)
MONOCYTES # BLD AUTO: 0.8 K/UL (ref 0.3–1)
MONOCYTES NFR BLD: 7.4 % (ref 4–15)
NEUTROPHILS # BLD AUTO: 6.6 K/UL (ref 1.8–7.7)
NEUTROPHILS NFR BLD: 62.4 % (ref 38–73)
NRBC BLD-RTO: 0 /100 WBC
PLATELET # BLD AUTO: 417 K/UL (ref 150–450)
PMV BLD AUTO: 9.3 FL (ref 9.2–12.9)
POTASSIUM SERPL-SCNC: 3.8 MMOL/L (ref 3.5–5.1)
PROT SERPL-MCNC: 7.2 G/DL (ref 6–8.4)
RBC # BLD AUTO: 4.17 M/UL (ref 4–5.4)
SODIUM SERPL-SCNC: 139 MMOL/L (ref 136–145)
WBC # BLD AUTO: 10.51 K/UL (ref 3.9–12.7)

## 2022-09-11 PROCEDURE — 96372 THER/PROPH/DIAG INJ SC/IM: CPT | Performed by: EMERGENCY MEDICINE

## 2022-09-11 PROCEDURE — 63600175 PHARM REV CODE 636 W HCPCS: Performed by: EMERGENCY MEDICINE

## 2022-09-11 PROCEDURE — 83880 ASSAY OF NATRIURETIC PEPTIDE: CPT | Performed by: EMERGENCY MEDICINE

## 2022-09-11 PROCEDURE — 85025 COMPLETE CBC W/AUTO DIFF WBC: CPT | Performed by: EMERGENCY MEDICINE

## 2022-09-11 PROCEDURE — 85379 FIBRIN DEGRADATION QUANT: CPT | Performed by: EMERGENCY MEDICINE

## 2022-09-11 PROCEDURE — 80053 COMPREHEN METABOLIC PANEL: CPT | Performed by: EMERGENCY MEDICINE

## 2022-09-11 RX ADMIN — KETOROLAC TROMETHAMINE 15 MG: 30 INJECTION, SOLUTION INTRAMUSCULAR; INTRAVENOUS at 12:09

## 2022-09-11 NOTE — ED PROVIDER NOTES
Encounter Date: 9/10/2022       History     Chief Complaint   Patient presents with    Leg Pain     Pt advised bother her feet and lower legs are swollen and hurting her - pt denies shortness of breath while in triage - pt has no other complaints at this time      46-year-old female with history of chronic back pain presents for evaluation of lower leg swelling that she noticed tonight.  She states her legs started to become painful tonight, worse on the right side, and she noticed they were swollen.  She denies any trauma or excessive walking today denies any history of swelling in the past.  She has had back pain radiating down her left leg in the past but this pain feels different.  She has chronic difficulty breathing with activity, unchanged now.  She denies any chest pain, fevers, or other complaints    Review of patient's allergies indicates:  No Known Allergies  No past medical history on file.  Past Surgical History:   Procedure Laterality Date     SECTION, LOW TRANSVERSE       No family history on file.  Social History     Tobacco Use    Smoking status: Never    Smokeless tobacco: Never   Substance Use Topics    Alcohol use: No    Drug use: No     Review of Systems   Constitutional:  Negative for fever.   HENT:  Negative for congestion.    Eyes:  Negative for redness.   Respiratory:  Negative for shortness of breath.    Cardiovascular:  Positive for leg swelling. Negative for chest pain.   Gastrointestinal:  Negative for abdominal pain.   Genitourinary:  Negative for dysuria.   Musculoskeletal:  Positive for back pain and joint swelling.   Skin:  Negative for rash.   Neurological:  Negative for headaches.   Psychiatric/Behavioral:  Negative for confusion.      Physical Exam     Initial Vitals [09/10/22 2338]   BP Pulse Resp Temp SpO2   (!) 147/76 84 16 98.6 °F (37 °C) 98 %      MAP       --         Physical Exam    Constitutional: She appears well-developed and well-nourished. She is not  diaphoretic. No distress.   HENT:   Head: Normocephalic and atraumatic.   Eyes: Conjunctivae are normal.   Neck: Neck supple.   Cardiovascular:  Normal rate, regular rhythm, S1 normal, S2 normal, normal heart sounds and intact distal pulses.           Pulmonary/Chest: Breath sounds normal. No respiratory distress. She has no wheezes. She has no rhonchi. She has no rales.   Abdominal: Abdomen is soft. There is no abdominal tenderness. There is no rebound and no guarding.   Musculoskeletal:         General: Edema present.      Cervical back: Neck supple.      Comments: 1+ right lower extremity edema below the knee, with mild associated tenderness, no erythema.  Trace left lower extremity edema.  DP pulses 2+ bilaterally     Neurological: She is alert and oriented to person, place, and time.   Skin: Skin is warm and dry.   Psychiatric: She has a normal mood and affect.       ED Course   Procedures  Labs Reviewed   COMPREHENSIVE METABOLIC PANEL - Abnormal; Notable for the following components:       Result Value    CO2 21 (*)     Glucose 121 (*)     Albumin 3.3 (*)     All other components within normal limits   CBC W/ AUTO DIFFERENTIAL - Abnormal; Notable for the following components:    Hemoglobin 10.8 (*)     Hematocrit 33.5 (*)     MCV 80 (*)     MCH 25.9 (*)     RDW 16.3 (*)     All other components within normal limits   D DIMER, QUANTITATIVE - Abnormal; Notable for the following components:    D-Dimer 0.99 (*)     All other components within normal limits   B-TYPE NATRIURETIC PEPTIDE          Imaging Results              US Lower Extremity Veins Bilateral (Final result)  Result time 09/11/22 01:57:51      Final result by Rosa Maria Martinez MD (09/11/22 01:57:51)                   Impression:      No evidence of deep venous thrombosis in either lower extremity.      Electronically signed by: Rosa Maria Martinez MD  Date:    09/11/2022  Time:    01:57               Narrative:    EXAMINATION:  US LOWER EXTREMITY VEINS  BILATERAL    CLINICAL HISTORY:  Localized edema    TECHNIQUE:  Duplex and color flow Doppler and dynamic compression was performed of the bilateral lower extremity veins was performed.    COMPARISON:  None    FINDINGS:  Right thigh veins: The common femoral, femoral, popliteal, upper greater saphenous, and deep femoral veins are patent and free of thrombus. The veins are normally compressible and have normal phasic flow and augmentation response.    Right calf veins: The visualized calf veins are patent.    Left thigh veins: The common femoral, femoral, popliteal, upper greater saphenous, and deep femoral veins are patent and free of thrombus. The veins are normally compressible and have normal phasic flow and augmentation response.    Left calf veins: The visualized calf veins are patent.    Miscellaneous: None                                       Medications   ketorolac injection 15 mg (15 mg Intramuscular Given 9/11/22 0001)     Medical Decision Making:   Initial Assessment:       46-year-old female with history of chronic back pain presents for evaluation of lower leg swelling that she noticed tonight.  She denies any clear precipitant, has not been walking more than usual, no history of similar previous episodes.  She reports associated leg pain, worse on the right side.  She has had chronic low back pain but radiation down left leg previously, this pain feels different with no worsening back pain now.  She has chronic BLUE, unchanged now, no associated chest pain or cough, fever, wheezing.  On arrival patient well-appearing with slightly elevated BP but otherwise normal vitals.  She has 1+ right lower extremity edema and trace left lower extremity edema, with mild associated tenderness but no erythema, intact distal circulation.    Symptoms most likely due to venous insufficiency and sedentary status due to chronic low back pain, but given new onset and risk factors will also a rule out any component of CKD,  also consider CHF due to chronic BLUE possibly untreated hypertension.  DVT less likely given bilateral nature, will rule out with D-dimer.      Basic labs with normal renal function, mild anemia, no other acute findings.  D-dimer was elevated, so will get ultrasound to rule out DVT.  Patient signed out to overnight MD pending DVT study and BNP, can be discharged if both normal with supportive care for venous insufficiency.    Update: US with no sign of DVT, BNP normal.                      Clinical Impression:   Final diagnoses:  [R60.0] Bilateral leg edema (Primary)      ED Disposition Condition    Discharge Good          ED Prescriptions    None       Follow-up Information       Follow up With Specialties Details Why Contact Info    Your primary care doctor  Call in 2 days To arrange for repeat ultrasound if swelling continues              Ronak Guerrier MD  09/11/22 7078

## 2022-09-11 NOTE — DISCHARGE INSTRUCTIONS
Thank you for letting us take care of you today! It was nice meeting you, and I hope you feel better soon.     Call your primary care doctor to make the first available appointment.     Keep all your medical appointments.     Take your regular medication as prescribed. Contact your primary care provider before running out of medication, or for any problems obtaining them.    Do not drive or operate heavy machinery while taking opioid or muscle relaxing medications. Examples include norco, percocet, xanax, valium, flexeril.     Overuse or long term use of pain and sedating medication may lead to addiction, dependence, organ failure, family and work problems, legal problems, accidental overdose and death.    If you do not have health insurance, you probably can afford it:  Call [phone] ([facility] hotline) or go to www.[employer].[place].gov    Your evaluation in the ED does not suggest any emergent or life threatening medical condition requiring admission or immediate intervention beyond that provided in the ED.   However, the signs of a serious problem sometimes take more time to appear.     Do not hesitate to return to the ER if any of the following occur:    Weakness, dizziness, fainting, or loss of consciousness   Fever of 100.4ºF (38ºC) or higher  Any worse symptoms  Any new or concerning symptoms        To protect yourself and others from COVID19:  Get vaccinated.   Anyone over 5 years old is eligible for vaccination.   Everyone 18 and older should get 3 total vaccine doses. Anyone over 50 years old or with certain chronic conditions should get a 4th dose.   Vaccination is shown to prevent getting sick, ending up in the hospital, or dying because of COVID19.   If you are vaccinated, help friends and family get the vaccine.    If not vaccinated:  Your shot is waiting for you. To get it:   Text your ZIP code to GETVAX ([id number]) or VACUNA ([id number]) in [place]  call 311, or [phone], or [phone], or [phone],    go to www.vaccines.gov, or  Call your health provider  If exposed to someone with cold, flu, or COVID19 symptoms, you must quarantine for at least 5 days.   Even if you have no symptoms   Otherwise you could give the virus to someone who dies from it  Some symptoms of COVID19 include fever, cough, sore throat, breathing troubles, loss of taste/smell, headaches, stomach upset, diarrhea.

## 2022-09-22 ENCOUNTER — HOSPITAL ENCOUNTER (EMERGENCY)
Facility: OTHER | Age: 47
Discharge: HOME | End: 2022-09-22
Attending: EMERGENCY MEDICINE
Payer: MEDICAID

## 2022-09-22 VITALS
SYSTOLIC BLOOD PRESSURE: 132 MMHG | HEART RATE: 79 BPM | RESPIRATION RATE: 18 BRPM | DIASTOLIC BLOOD PRESSURE: 68 MMHG | BODY MASS INDEX: 44.18 KG/M2 | TEMPERATURE: 98 F | HEIGHT: 61 IN | OXYGEN SATURATION: 98 % | WEIGHT: 234 LBS

## 2022-09-22 DIAGNOSIS — S93.402A SPRAIN OF LEFT ANKLE, UNSPECIFIED LIGAMENT, INITIAL ENCOUNTER: Primary | ICD-10-CM
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
## 2022-09-22 LAB
B-HCG UR QL: NEGATIVE
CTP QC/QA: NORMAL

## 2022-09-22 PROCEDURE — 25000003 PHARM REV CODE 250: Performed by: PHYSICIAN ASSISTANT

## 2022-09-22 PROCEDURE — 99283 EMERGENCY DEPT VISIT LOW MDM: CPT

## 2022-09-22 PROCEDURE — 81025 URINE PREGNANCY TEST: CPT | Performed by: EMERGENCY MEDICINE

## 2022-09-22 RX ORDER — NAPROXEN 500 MG/1
500 TABLET ORAL
Status: COMPLETED | OUTPATIENT
Start: 2022-09-22 | End: 2022-09-22

## 2022-09-22 RX ORDER — ACETAMINOPHEN 325 MG/1
650 TABLET ORAL
Status: COMPLETED | OUTPATIENT
Start: 2022-09-22 | End: 2022-09-22

## 2022-09-22 RX ADMIN — NAPROXEN 500 MG: 500 TABLET ORAL at 07:09

## 2022-09-22 RX ADMIN — ACETAMINOPHEN 650 MG: 325 TABLET, FILM COATED ORAL at 06:09

## 2022-09-22 NOTE — ED TRIAGE NOTES
Pt arrived to ED with c/o bilateral leg and ankle pain. Pt reports leg pain started a week ago and today pt fell down the stairs. Pt c/o ankle pain and swelling after the fall. Pt AAOx4, NAD noted.

## 2022-09-22 NOTE — FIRST PROVIDER EVALUATION
Emergency Department TeleTriage Encounter Note      CHIEF COMPLAINT    Chief Complaint   Patient presents with    ankle pain      Pt c.o bilateral ankle pain onset this AM after falling on steps.  AAO x 3nadn skin w.d   no obvious deformity noted to bilateral ankles. +pedal pulse bilaterally       VITAL SIGNS   Initial Vitals [09/22/22 1726]   BP Pulse Resp Temp SpO2   132/68 79 18 98.1 °F (36.7 °C) 98 %      MAP       --            ALLERGIES    Review of patient's allergies indicates:  No Known Allergies    PROVIDER TRIAGE NOTE  HPI: Alexia Fraire, a 46 y.o. female presents to the ED evaluation of bilateral ankle pain after fall.       Constitutional: well nourished, well developed, appearing stated age, NAD   HEENT: NCAT, symmetrical lids, No obvious facial deformity.  Normal phonation. Normal Conjunctiva, Gross EOMs intact   Neck: NAROM   Respiratory: Normal effort.  No obvious use of accessory muscles   Musculoskeletal: Moved upper extremities well   Neuro: Alert, answers questions appropriately    Psych: appropriate mood and affect          ORDERS  Labs Reviewed - No data to display    ED Orders (720h ago, onward)      None              Virtual Visit Note: The provider triage portion of this emergency department evaluation and documentation was performed via [employer], a HIPAA-compliant telemedicine application, in concert with a tele-presenter in the room. A face to face patient evaluation with one of my colleagues will occur once the patient is placed in an emergency department room.      DISCLAIMER: This note was prepared with [employer] voice recognition transcription software. Garbled syntax, mangled pronouns, and other bizarre constructions may be attributed to that software system.

## 2022-09-23 NOTE — ED PROVIDER NOTES
"     Source of History:  Patient     Chief complaint:  ankle pain  (Pt c.o bilateral ankle pain onset this AM after falling on steps.  AAO x 3nadn skin w.d   no obvious deformity noted to bilateral ankles. +pedal pulse bilaterally)      HPI:  Alexia Fraire is a 46 y.o. female who is presenting to the ED with left ankle pain and swelling.  Patient states that she had a fall down 3 steps today.  She states that her right leg gave out and then she rolled her left ankle.  She reports it is painful to bear weight on both of her legs but more specifically on her left extremity.  She was seen here in the ED 12 days ago for right leg pain and swelling, ultrasound was negative for DVT.  This is the extent to the patients complaints today here in the emergency department.    ROS: As per HPI and below:  General: No fever.  No chills.  No fatigue.  Neurologic: No focal weakness.  No numbness.  MSK: + left ankle pain and swelling   Integument: No rashes or lesions.  Allergy/immunology:  Not immunocompromised.     Review of patient's allergies indicates:  No Known Allergies    PMH:  As per HPI and below:  No past medical history on file.  Past Surgical History:   Procedure Laterality Date     SECTION, LOW TRANSVERSE         Social History     Tobacco Use    Smoking status: Never    Smokeless tobacco: Never   Substance Use Topics    Alcohol use: No    Drug use: No       Physical Exam:    /68 (BP Location: Left arm, Patient Position: Sitting)   Pulse 79   Temp 98.1 °F (36.7 °C) (Oral)   Resp 18   Ht 5' 1" (1.549 m)   Wt 106.1 kg (234 lb)   SpO2 98%   BMI 44.21 kg/m²   Nursing note and vital signs reviewed.  Appearance: No acute distress. Non toxic appearing   Eyes: No conjunctival injection.  ENT: Normal phonation.  Cardiac: Intact pedal pulses   Musculoskeletal:  Left ankle:  Diffuse edema.  Pain to medial and lateral malleolus.  No obvious deformity.  Normal range of motion.  Right knee with " crepitus, pain reproduced to right medial thigh with leg extension.  No obvious bony deformity.  Skin: No rashes seen. No abrasions.  No ecchymoses.  Mental Status:  Alert and oriented x 3.  Appropriate, conversant.   Labs that have been ordered have been independently reviewed and interpreted by myself.    I decided to obtain the patient's medical records.    MDM/ Differential Dx:    46 y.o. female who is presenting to the emergency department with left ankle pain and swelling and chronic right leg pain.  Patient has no obvious deformities on exam.  Limbs are distally neurovascular intact.  X-ray without fracture dislocation.  Patient likely has ankle sprain.  Ace wrap applied, given crutches and advised on rice instructions           Diagnostic Impression:    1. Sprain of left ankle, unspecified ligament, initial encounter    2. Fall                   Ricky Kwan PA-C  09/22/22 2013

## 2023-02-01 ENCOUNTER — HOSPITAL ENCOUNTER (EMERGENCY)
Facility: OTHER | Age: 48
Discharge: HOME | End: 2023-02-01
Attending: EMERGENCY MEDICINE
Payer: MEDICAID

## 2023-02-01 VITALS
RESPIRATION RATE: 16 BRPM | HEIGHT: 61 IN | WEIGHT: 215 LBS | OXYGEN SATURATION: 98 % | BODY MASS INDEX: 40.59 KG/M2 | DIASTOLIC BLOOD PRESSURE: 65 MMHG | TEMPERATURE: 98 F | SYSTOLIC BLOOD PRESSURE: 130 MMHG | HEART RATE: 87 BPM

## 2023-02-01 DIAGNOSIS — R10.13 EPIGASTRIC ABDOMINAL PAIN: Primary | ICD-10-CM

## 2023-02-01 LAB
ALBUMIN SERPL BCP-MCNC: 3.5 G/DL (ref 3.5–5.2)
ALP SERPL-CCNC: 76 U/L (ref 55–135)
ALT SERPL W/O P-5'-P-CCNC: 20 U/L (ref 10–44)
ANION GAP SERPL CALC-SCNC: 6 MMOL/L (ref 8–16)
AST SERPL-CCNC: 18 U/L (ref 10–40)
B-HCG UR QL: NEGATIVE
BACTERIA #/AREA URNS HPF: ABNORMAL /HPF
BASOPHILS # BLD AUTO: 0.03 K/UL (ref 0–0.2)
BASOPHILS NFR BLD: 0.2 % (ref 0–1.9)
BILIRUB SERPL-MCNC: 0.9 MG/DL (ref 0.1–1)
BILIRUB UR QL STRIP: ABNORMAL
BUN SERPL-MCNC: 10 MG/DL (ref 6–20)
CALCIUM SERPL-MCNC: 9.4 MG/DL (ref 8.7–10.5)
CHLORIDE SERPL-SCNC: 103 MMOL/L (ref 95–110)
CLARITY UR: ABNORMAL
CO2 SERPL-SCNC: 28 MMOL/L (ref 23–29)
COLOR UR: YELLOW
CREAT SERPL-MCNC: 1 MG/DL (ref 0.5–1.4)
CTP QC/QA: YES
DIFFERENTIAL METHOD: ABNORMAL
EOSINOPHIL # BLD AUTO: 0.4 K/UL (ref 0–0.5)
EOSINOPHIL NFR BLD: 3.2 % (ref 0–8)
ERYTHROCYTE [DISTWIDTH] IN BLOOD BY AUTOMATED COUNT: 17.5 % (ref 11.5–14.5)
EST. GFR  (NO RACE VARIABLE): >60 ML/MIN/1.73 M^2
GLUCOSE SERPL-MCNC: 104 MG/DL (ref 70–110)
GLUCOSE UR QL STRIP: NEGATIVE
HCT VFR BLD AUTO: 35.4 % (ref 37–48.5)
HCV AB SERPL QL IA: NEGATIVE
HGB BLD-MCNC: 11.3 G/DL (ref 12–16)
HGB UR QL STRIP: ABNORMAL
HIV 1+2 AB+HIV1 P24 AG SERPL QL IA: NEGATIVE
IMM GRANULOCYTES # BLD AUTO: 0.05 K/UL (ref 0–0.04)
IMM GRANULOCYTES NFR BLD AUTO: 0.4 % (ref 0–0.5)
KETONES UR QL STRIP: ABNORMAL
LEUKOCYTE ESTERASE UR QL STRIP: ABNORMAL
LIPASE SERPL-CCNC: 33 U/L (ref 4–60)
LYMPHOCYTES # BLD AUTO: 2.2 K/UL (ref 1–4.8)
LYMPHOCYTES NFR BLD: 15.9 % (ref 18–48)
MCH RBC QN AUTO: 25.7 PG (ref 27–31)
MCHC RBC AUTO-ENTMCNC: 31.9 G/DL (ref 32–36)
MCV RBC AUTO: 81 FL (ref 82–98)
MICROSCOPIC COMMENT: ABNORMAL
MONOCYTES # BLD AUTO: 1.3 K/UL (ref 0.3–1)
MONOCYTES NFR BLD: 9.2 % (ref 4–15)
NEUTROPHILS # BLD AUTO: 9.6 K/UL (ref 1.8–7.7)
NEUTROPHILS NFR BLD: 71.1 % (ref 38–73)
NITRITE UR QL STRIP: NEGATIVE
NRBC BLD-RTO: 0 /100 WBC
PH UR STRIP: 6 [PH] (ref 5–8)
PLATELET # BLD AUTO: 351 K/UL (ref 150–450)
PMV BLD AUTO: 9.8 FL (ref 9.2–12.9)
POTASSIUM SERPL-SCNC: 3.4 MMOL/L (ref 3.5–5.1)
PROT SERPL-MCNC: 7.5 G/DL (ref 6–8.4)
PROT UR QL STRIP: ABNORMAL
RBC # BLD AUTO: 4.39 M/UL (ref 4–5.4)
RBC #/AREA URNS HPF: 3 /HPF (ref 0–4)
SODIUM SERPL-SCNC: 137 MMOL/L (ref 136–145)
SP GR UR STRIP: 1.02 (ref 1–1.03)
SQUAMOUS #/AREA URNS HPF: 23 /HPF
URN SPEC COLLECT METH UR: ABNORMAL
UROBILINOGEN UR STRIP-ACNC: ABNORMAL EU/DL
WBC # BLD AUTO: 13.55 K/UL (ref 3.9–12.7)
WBC #/AREA URNS HPF: 10 /HPF (ref 0–5)

## 2023-02-01 PROCEDURE — 25500020 PHARM REV CODE 255: Performed by: EMERGENCY MEDICINE

## 2023-02-01 PROCEDURE — 86803 HEPATITIS C AB TEST: CPT | Performed by: EMERGENCY MEDICINE

## 2023-02-01 PROCEDURE — 81025 URINE PREGNANCY TEST: CPT | Performed by: EMERGENCY MEDICINE

## 2023-02-01 PROCEDURE — 85025 COMPLETE CBC W/AUTO DIFF WBC: CPT | Performed by: NURSE PRACTITIONER

## 2023-02-01 PROCEDURE — 80053 COMPREHEN METABOLIC PANEL: CPT | Performed by: NURSE PRACTITIONER

## 2023-02-01 PROCEDURE — 63600175 PHARM REV CODE 636 W HCPCS: Performed by: EMERGENCY MEDICINE

## 2023-02-01 PROCEDURE — 25000003 PHARM REV CODE 250: Performed by: EMERGENCY MEDICINE

## 2023-02-01 PROCEDURE — 83690 ASSAY OF LIPASE: CPT | Performed by: NURSE PRACTITIONER

## 2023-02-01 PROCEDURE — 81000 URINALYSIS NONAUTO W/SCOPE: CPT | Performed by: NURSE PRACTITIONER

## 2023-02-01 PROCEDURE — 96374 THER/PROPH/DIAG INJ IV PUSH: CPT

## 2023-02-01 PROCEDURE — 99285 EMERGENCY DEPT VISIT HI MDM: CPT | Mod: 25

## 2023-02-01 PROCEDURE — 87389 HIV-1 AG W/HIV-1&-2 AB AG IA: CPT | Performed by: EMERGENCY MEDICINE

## 2023-02-01 RX ORDER — PANTOPRAZOLE SODIUM 40 MG/1
40 TABLET, DELAYED RELEASE ORAL DAILY
Qty: 30 TABLET | Refills: 0 | Status: SHIPPED | OUTPATIENT
Start: 2023-02-01 | End: 2023-03-03

## 2023-02-01 RX ORDER — MAG HYDROX/ALUMINUM HYD/SIMETH 200-200-20
30 SUSPENSION, ORAL (FINAL DOSE FORM) ORAL ONCE
Status: COMPLETED | OUTPATIENT
Start: 2023-02-01 | End: 2023-02-01

## 2023-02-01 RX ORDER — PANTOPRAZOLE SODIUM 40 MG/1
40 TABLET, DELAYED RELEASE ORAL DAILY
Qty: 30 TABLET | Refills: 0 | Status: SHIPPED | OUTPATIENT
Start: 2023-02-01 | End: 2023-02-01 | Stop reason: SDUPTHER

## 2023-02-01 RX ORDER — KETOROLAC TROMETHAMINE 30 MG/ML
10 INJECTION, SOLUTION INTRAMUSCULAR; INTRAVENOUS
Status: COMPLETED | OUTPATIENT
Start: 2023-02-01 | End: 2023-02-01

## 2023-02-01 RX ORDER — LIDOCAINE HYDROCHLORIDE 20 MG/ML
15 SOLUTION OROPHARYNGEAL ONCE
Status: COMPLETED | OUTPATIENT
Start: 2023-02-01 | End: 2023-02-01

## 2023-02-01 RX ADMIN — ALUMINUM HYDROXIDE, MAGNESIUM HYDROXIDE, AND SIMETHICONE 30 ML: 200; 200; 20 SUSPENSION ORAL at 08:02

## 2023-02-01 RX ADMIN — KETOROLAC TROMETHAMINE 10 MG: 30 INJECTION, SOLUTION INTRAMUSCULAR; INTRAVENOUS at 06:02

## 2023-02-01 RX ADMIN — IOHEXOL 100 ML: 350 INJECTION, SOLUTION INTRAVENOUS at 07:02

## 2023-02-01 RX ADMIN — LIDOCAINE HYDROCHLORIDE 15 ML: 20 SOLUTION ORAL at 08:02

## 2023-02-01 NOTE — FIRST PROVIDER EVALUATION
Emergency Department TeleTriage Encounter Note      CHIEF COMPLAINT    Chief Complaint   Patient presents with    Abdominal Pain     Pt c.o diffuse abd pain onset yesterday.  Pt denies n/v. Pt denies urinary symptoms.  AAO x 3 nadn skin w.d        VITAL SIGNS   Initial Vitals [02/01/23 1658]   BP Pulse Resp Temp SpO2   120/77 99 18 98.3 °F (36.8 °C) 98 %      MAP       --            ALLERGIES    Review of patient's allergies indicates:  No Known Allergies    PROVIDER TRIAGE NOTE  TeleTriage Note: Alexia Fraire, a nontoxic/well appearing, 47 y.o. female, presented to the ED with c/o generalized abdominal pain with generalized weakness. Denies N/V/D or fevers.     All ED beds are full at present; patient notified of this status.  Patient seen and medically screened by Nurse Practitioner via teletriage. Orders initiated at triage to expedite care.  Patient is stable to return to the waiting room and will be placed in an ED bed when available.  Care will be transferred to an alternate provider when patient has been placed in an Exam Room from the [facility] for physical exam, additional orders, and disposition.  5:01 PM Ava Lorenzo, DNP, FNP-C        ORDERS  Labs Reviewed   HIV 1 / 2 ANTIBODY   HEPATITIS C ANTIBODY   CBC W/ AUTO DIFFERENTIAL   COMPREHENSIVE METABOLIC PANEL   LIPASE   URINALYSIS, REFLEX TO URINE CULTURE       ED Orders (720h ago, onward)      Start Ordered     Status Ordering Provider    02/01/23 1703 02/01/23 1702  Vital signs  Every 2 hours         Ordered MAURAAVA KLEIN    02/01/23 1703 02/01/23 1702  Diet NPO  Diet effective now         Ordered MAURAAVA RM    02/01/23 1703 02/01/23 1702  Insert peripheral IV  Once         Ordered MAURAAVA    02/01/23 1703 02/01/23 1702  CBC W/ AUTO DIFFERENTIAL  STAT         Ordered AVA LORENZO    02/01/23 1703 02/01/23 1702  Comp. Metabolic Panel  STAT         Ordered MAURAAVA RM    02/01/23 1703 02/01/23 1702  POCT  Venous Blood Gas Once  Once        Comments: This test should be used for VBGs.  If using this order for other tests (K, creatinine, HCT, PT/INR, lactate etc)  ONLY do so in the case of an emergency or rapid response.Notify Physician if: see parameters below.      Ordered MAURA, SHAREE SU.    02/01/23 1703 02/01/23 1702  Lipase  STAT         Ordered MAURA, SHAREE SU.    02/01/23 1703 02/01/23 1702  Urinalysis, Reflex to Urine Culture Urine, Clean Catch  STAT         Ordered MAURA, SHAREE SU.    02/01/23 1700 02/01/23 1659  HIV 1/2 Ag/Ab (4th Gen)  STAT         Ordered TONY LONG    02/01/23 1700 02/01/23 1659  Hepatitis C Antibody  STAT         Ordered OTNY LONG              Virtual Visit Note: The provider triage portion of this emergency department evaluation and documentation was performed via [employer], a HIPAA-compliant telemedicine application, in concert with a tele-presenter in the room. A face to face patient evaluation with one of my colleagues will occur once the patient is placed in an emergency department room.      DISCLAIMER: This note was prepared with [employer] voice recognition transcription software. Garbled syntax, mangled pronouns, and other bizarre constructions may be attributed to that software system.

## 2023-02-02 NOTE — ED PROVIDER NOTES
"SCRIBE #1 NOTE: I, Ayaka Ferrell, am scribing for, and in the presence of,  Carolee Tello MD.       Source of History:  Patient, medical records    Chief complaint:  Abdominal Pain (Pt c.o diffuse abd pain onset yesterday.  Pt denies n/v. Pt denies urinary symptoms.  AAO x 3 nadn skin w.d )      HPI:  Alexia Fraire is a 47 y.o. female presenting with abdominal pain for approximately 28 hours. Patient states her pain began yesterday at 12 PM. She also reports weakness, chills, congestion, and urinary frequency. She states that she has been unable to eat since her pain began. She denies any nausea, vomiting, or dysuria. Patient states that her last BM was yesterday, which was normal. She denies any history of abdominal surgery.    This is the extent to the patients complaints today here in the emergency department.    ROS: As per HPI and below:  General: No fever.  Notes chills. Notes decreased appetite.   Eyes: No visual changes.  ENT: No sore throat. No ear pain. Notes congestion.  Head: No headache.    Respiratory: No shortness of breath.  Cardiovascular: No chest pain.  Abdomen: Notes abdominal pain.  No nausea or vomiting.  Genito-Urinary: Notes urinary frequency.  Neurologic: No focal weakness.  No numbness. Notes generalized weakness.   MSK: no back pain.  Integument: No rashes or lesions.  Hematologic: No easy bruising.  Endocrine: No excessive thirst or urination.      Review of patient's allergies indicates:  No Known Allergies    PMH:  As per HPI and below:  No past medical history on file.  Past Surgical History:   Procedure Laterality Date     SECTION, LOW TRANSVERSE         Social History     Tobacco Use    Smoking status: Never    Smokeless tobacco: Never   Substance Use Topics    Alcohol use: No    Drug use: No       Physical Exam:    /65   Pulse 87   Temp 98.3 °F (36.8 °C) (Oral)   Resp 16   Ht 5' 1" (1.549 m)   Wt 97.5 kg (215 lb)   SpO2 98%   BMI 40.62 kg/m² "   Nursing note and vital signs reviewed.    Appearance: No acute distress.  Eyes: No conjunctival injection.  Neck: No deformity.   ENT: Oropharynx clear.  No stridor.   Chest/ Respiratory: Clear to auscultation bilaterally.  Good air movement.  No wheezes.  No rhonchi. No rales. No accessory muscle use.  Cardiovascular: Regular rate and rhythm.  No murmurs. No gallops. No rubs.  Abdomen: Obese. Soft.  Mild distension. Diffuse tenderness, worse in epigastric region. Some guarding. Non-peritoneal. Normal bowel sounds.  Musculoskeletal: Good range of motion all joints.  No deformities.  Neck supple.  No meningismus.  Skin: No rashes seen.  Good turgor.  No abrasions.  No ecchymoses.  Neurologic: Motor intact.  Sensation intact.  Cerebellar intact.  Cranial nerves intact.  Mental Status:  Alert and oriented x 3.  Appropriate, conversant.        I decided to obtain the patient's medical records.  Summary of Medical Records:      Labs:  Results for orders placed or performed during the hospital encounter of 02/01/23   HIV 1/2 Ag/Ab (4th Gen)   Result Value Ref Range    HIV 1/2 Ag/Ab Negative Negative   Hepatitis C Antibody   Result Value Ref Range    Hepatitis C Ab Negative Negative   CBC W/ AUTO DIFFERENTIAL   Result Value Ref Range    WBC 13.55 (H) 3.90 - 12.70 K/uL    RBC 4.39 4.00 - 5.40 M/uL    Hemoglobin 11.3 (L) 12.0 - 16.0 g/dL    Hematocrit 35.4 (L) 37.0 - 48.5 %    MCV 81 (L) 82 - 98 fL    MCH 25.7 (L) 27.0 - 31.0 pg    MCHC 31.9 (L) 32.0 - 36.0 g/dL    RDW 17.5 (H) 11.5 - 14.5 %    Platelets 351 150 - 450 K/uL    MPV 9.8 9.2 - 12.9 fL    Immature Granulocytes 0.4 0.0 - 0.5 %    Gran # (ANC) 9.6 (H) 1.8 - 7.7 K/uL    Immature Grans (Abs) 0.05 (H) 0.00 - 0.04 K/uL    Lymph # 2.2 1.0 - 4.8 K/uL    Mono # 1.3 (H) 0.3 - 1.0 K/uL    Eos # 0.4 0.0 - 0.5 K/uL    Baso # 0.03 0.00 - 0.20 K/uL    nRBC 0 0 /100 WBC    Gran % 71.1 38.0 - 73.0 %    Lymph % 15.9 (L) 18.0 - 48.0 %    Mono % 9.2 4.0 - 15.0 %    Eosinophil %  3.2 0.0 - 8.0 %    Basophil % 0.2 0.0 - 1.9 %    Differential Method Automated    Comp. Metabolic Panel   Result Value Ref Range    Sodium 137 136 - 145 mmol/L    Potassium 3.4 (L) 3.5 - 5.1 mmol/L    Chloride 103 95 - 110 mmol/L    CO2 28 23 - 29 mmol/L    Glucose 104 70 - 110 mg/dL    BUN 10 6 - 20 mg/dL    Creatinine 1.0 0.5 - 1.4 mg/dL    Calcium 9.4 8.7 - 10.5 mg/dL    Total Protein 7.5 6.0 - 8.4 g/dL    Albumin 3.5 3.5 - 5.2 g/dL    Total Bilirubin 0.9 0.1 - 1.0 mg/dL    Alkaline Phosphatase 76 55 - 135 U/L    AST 18 10 - 40 U/L    ALT 20 10 - 44 U/L    Anion Gap 6 (L) 8 - 16 mmol/L    eGFR >60 >60 mL/min/1.73 m^2   Lipase   Result Value Ref Range    Lipase 33 4 - 60 U/L   Urinalysis, Reflex to Urine Culture Urine, Clean Catch    Specimen: Urine   Result Value Ref Range    Specimen UA Urine, Clean Catch     Color, UA Yellow Yellow, Straw, Patti    Appearance, UA Hazy (A) Clear    pH, UA 6.0 5.0 - 8.0    Specific Gravity, UA 1.025 1.005 - 1.030    Protein, UA Trace (A) Negative    Glucose, UA Negative Negative    Ketones, UA Trace (A) Negative    Bilirubin (UA) 1+ (A) Negative    Occult Blood UA Trace (A) Negative    Nitrite, UA Negative Negative    Urobilinogen, UA 4.0-6.0 (A) <2.0 EU/dL    Leukocytes, UA Trace (A) Negative   Urinalysis Microscopic   Result Value Ref Range    RBC, UA 3 0 - 4 /hpf    WBC, UA 10 (H) 0 - 5 /hpf    Bacteria Rare None-Occ /hpf    Squam Epithel, UA 23 /hpf    Microscopic Comment SEE COMMENT    POCT urine pregnancy   Result Value Ref Range    POC Preg Test, Ur Negative Negative     Acceptable Yes      Imaging Results              CT Abdomen Pelvis With Contrast (Final result)  Result time 02/01/23 19:45:26      Final result by Blake Lacey MD (02/01/23 19:45:26)                   Impression:      1. Findings suggesting hepatic steatosis, correlation with LFTs recommended.  2. Moderate to large amount of stool within the colon, findings may reflect developing  constipation.  3. Leiomyomatous uterus.  4. No findings to suggest obstructive uropathy.  5. Several additional findings above.      Electronically signed by: Blake Lacey MD  Date:    02/01/2023  Time:    19:45               Narrative:    EXAMINATION:  CT ABDOMEN PELVIS WITH CONTRAST    CLINICAL HISTORY:  Abdominal abscess/infection suspected;    TECHNIQUE:  Low dose axial images, sagittal and coronal reformations were obtained from the lung bases to the pubic symphysis following the IV administration of 100 mL of Omnipaque 350 .  Oral contrast was not given.    COMPARISON:  None.    FINDINGS:  Images of the lower thorax are remarkable for minimal dependent atelectasis.    The liver is hypoattenuating suggesting steatosis, correlation with LFTs recommended.  Subcentimeter low attenuating lesions arise from the lateral aspect of the left hepatic lobe, too small for characterization.  The spleen, pancreas, gallbladder and adrenal glands are grossly unremarkable.  There is no biliary dilation or ascites.  The portal vein, splenic vein, SMV, celiac axis and SMA all are patent.    The kidneys enhance symmetrically without hydronephrosis or nephrolithiasis.  There are subcentimeter low attenuating lesions arising from the upper pole of the right kidney, too small for characterization.  The bilateral ureters are unremarkable without calculi seen.  The urinary bladder is decompressed.  The uterus has a lobular contour noting multiple scattered uterine leiomyoma, largest on the left measures 3.6 cm.  The adnexa is grossly unremarkable noting right ovarian dominant follicle or small cyst.    There is moderate stool in the right colon.  The terminal ileum is unremarkable.  The appendix is unremarkable, no pericecal inflammation.  The small bowel is grossly unremarkable.  Several scattered shotty periaortic and paracaval lymph nodes are noted.  There are a few scattered upper limit of normal caliber pericecal lymph nodes.   No focal organized pelvic fluid collection.    There are degenerative changes of the spine.  No significant inguinal lymphadenopathy.                                        Initial Impression  47 y.o. female with abdominal pain since lunchtime yesterday associated with anorexia. Plan: CT abdomen and pelvis, labs done in triage show elevated WBC count.    Differential Dx:  GERD, intestinal spasm, gastroenteritis, gastritis, ulcer, cholecystitis, cholelithiasis, gallstones, pancreatitis, ileus, small bowel obstruction, appendicitis, diverticulitis, colitis, constipation, intestinal gas pain.    MDM:    CT negative for intra-abdominal abscess, intra-abdominal acute findings.  CT did show evidence of constipation.  No suspicion of dangerous or surgical cause of symptoms at this time.    Patient improved with treatment in the emergency department and comfortable going home. Discussed reasons to return and importance of followup.  Patient understands that the emergency visit today is primarily to address immediate concerns and to rule out emergent cause of symptoms and that they may require further workup and evaluation as an outpatient. All questions addressed and patient given discharge instructions and followup information.     Physician Attestation for Scribe: I, Carolee Tello MD, reviewed documentation as scribed in my presence, which is both accurate and complete.      Scribe Attestation:   Scribe #1: I performed the above scribed service and the documentation accurately describes the services I performed. I attest to the accuracy of the note.      Diagnostic Impression:    1. Epigastric abdominal pain         ED Disposition Condition    Discharge Stable            ED Prescriptions       Medication Sig Dispense Start Date End Date Auth. Provider    pantoprazole (PROTONIX) 40 MG tablet  (Status: Discontinued) Take 1 tablet (40 mg total) by mouth once daily. 30 tablet 2/1/2023 2/1/2023 Carolee Tello MD    pantoprazole  (PROTONIX) 40 MG tablet Take 1 tablet (40 mg total) by mouth once daily. 30 tablet 2/1/2023 3/3/2023 Carolee Tello MD          Follow-up Information       Follow up With Specialties Details Why Contact Info    [facility]  Schedule an appointment as soon as possible for a visit   [address] [facility] [address]  [phone]      [facility]    [address] [facility] [address]  [phone]                 Carolee Tello MD  02/12/23 1928

## 2023-06-17 ENCOUNTER — HOSPITAL ENCOUNTER (EMERGENCY)
Facility: OTHER | Age: 48
Discharge: HOME | End: 2023-06-17
Attending: EMERGENCY MEDICINE
Payer: MEDICAID

## 2023-06-17 VITALS
DIASTOLIC BLOOD PRESSURE: 78 MMHG | SYSTOLIC BLOOD PRESSURE: 135 MMHG | RESPIRATION RATE: 16 BRPM | WEIGHT: 270 LBS | BODY MASS INDEX: 50.98 KG/M2 | OXYGEN SATURATION: 98 % | HEIGHT: 61 IN | HEART RATE: 94 BPM | TEMPERATURE: 99 F

## 2023-06-17 DIAGNOSIS — H10.9 CONJUNCTIVITIS OF LEFT EYE, UNSPECIFIED CONJUNCTIVITIS TYPE: ICD-10-CM

## 2023-06-17 DIAGNOSIS — R05.9 COUGH: ICD-10-CM

## 2023-06-17 DIAGNOSIS — J06.9 VIRAL URI WITH COUGH: Primary | ICD-10-CM

## 2023-06-17 LAB
CTP QC/QA: YES
CTP QC/QA: YES
GROUP A STREP, MOLECULAR: NEGATIVE
POC MOLECULAR INFLUENZA A AGN: NEGATIVE
POC MOLECULAR INFLUENZA B AGN: NEGATIVE
SARS-COV-2 RDRP RESP QL NAA+PROBE: NEGATIVE

## 2023-06-17 PROCEDURE — 87651 STREP A DNA AMP PROBE: CPT | Performed by: PHYSICIAN ASSISTANT

## 2023-06-17 PROCEDURE — 25000003 PHARM REV CODE 250

## 2023-06-17 PROCEDURE — 99284 EMERGENCY DEPT VISIT MOD MDM: CPT | Mod: 25

## 2023-06-17 PROCEDURE — 87635 SARS-COV-2 COVID-19 AMP PRB: CPT | Performed by: PHYSICIAN ASSISTANT

## 2023-06-17 RX ORDER — BENZONATATE 200 MG/1
200 CAPSULE ORAL 3 TIMES DAILY PRN
Qty: 20 CAPSULE | Refills: 0 | Status: SHIPPED | OUTPATIENT
Start: 2023-06-17 | End: 2023-06-27

## 2023-06-17 RX ORDER — IBUPROFEN 400 MG/1
800 TABLET ORAL
Status: COMPLETED | OUTPATIENT
Start: 2023-06-17 | End: 2023-06-17

## 2023-06-17 RX ORDER — OLOPATADINE HYDROCHLORIDE 1 MG/ML
1 SOLUTION/ DROPS OPHTHALMIC 2 TIMES DAILY
Qty: 5 ML | Refills: 0 | Status: SHIPPED | OUTPATIENT
Start: 2023-06-17 | End: 2024-06-16

## 2023-06-17 RX ORDER — ONDANSETRON 4 MG/1
4 TABLET, ORALLY DISINTEGRATING ORAL EVERY 6 HOURS PRN
Qty: 20 TABLET | Refills: 0 | Status: SHIPPED | OUTPATIENT
Start: 2023-06-17

## 2023-06-17 RX ORDER — GUAIFENESIN AND DEXTROMETHORPHAN HYDROBROMIDE 600; 30 MG/1; MG/1
1 TABLET, EXTENDED RELEASE ORAL 2 TIMES DAILY PRN
Qty: 20 TABLET | Refills: 0 | Status: SHIPPED | OUTPATIENT
Start: 2023-06-17 | End: 2023-06-27

## 2023-06-17 RX ADMIN — IBUPROFEN 800 MG: 400 TABLET ORAL at 02:06

## 2023-06-17 NOTE — ED TRIAGE NOTES
"PT arrived with c/o of sore throat and L eye redness, itching, burning, and crustiness x 3 days.  Pt also endorses body aches, HA, cough, and fatigue.  Reports fever T-max "over 100" 2 days ago.  Pt states she to tolerate liquids but has not been eating.  Pt answering questions appropriately, speaking in complete sentences, respirations even and unlabored.  Aao x 4.  "

## 2023-06-17 NOTE — ED PROVIDER NOTES
"Source of History:  Patient    Chief complaint:  Sore Throat (Reports sore throat, watery eyes, and cough x 2 days. Reports subjective fevers. )      HPI:  Alexia Fraire is a 47 y.o. female presenting with sore throat x3 days.  Patient reports that she became sick 3 days ago with sore throat, nonproductive cough, congestion, and headache.  Reports associated nausea and decreased oral intake, denies any vomiting, diarrhea, abdominal pain, chest pain.  She states she is been having associated generalized weakness, fatigue, body aches, and upper back pain.  Reports she is been taking TheraFlu, [place], Tylenol with moderate relief.  Reports her headache last night with severe, describes it as an aching pain across her entire head, improves with Tylenol.  Of note, she states that her grandchildren and her daughter are all currently sick with similar symptoms, and her  began feeling sick today.    This is the extent to the patients complaints today here in the emergency department.    PMH:  As per HPI and below:  History reviewed. No pertinent past medical history.  Past Surgical History:   Procedure Laterality Date     SECTION, LOW TRANSVERSE         Social History     Tobacco Use    Smoking status: Never    Smokeless tobacco: Never   Substance Use Topics    Alcohol use: No    Drug use: No     Review of patient's allergies indicates:  No Known Allergies    ROS: As per HPI and below:  General: + fever, chills  Eyes: +left eye pain and blurry vision  ENT: + congestion  Head: + headache.    Chest: + shortness of breath.  Cardiovascular: No chest pain.  Abdomen: No abdominal pain.  +nausea, no vomiting or diarrhea  Genito-Urinary: No abnormal urination.  Neurologic: No focal weakness.  No numbness.  MSK: no back pain.    Physical Exam:    /78   Pulse 94   Temp 99 °F (37.2 °C) (Oral)   Resp 16   Ht 5' 1" (1.549 m)   Wt 122.5 kg (270 lb)   SpO2 98%   BMI 51.02 kg/m²   Vitals:    " "06/17/23 1159 06/17/23 1336   BP: 114/77 135/78   Pulse: (!) 112 94   Resp: 20 16   Temp: 98.6 °F (37 °C) 99 °F (37.2 °C)   TempSrc: Oral Oral   SpO2: 97% 98%   Weight: 122.5 kg (270 lb)    Height: 5' 1" (1.549 m)        Nursing note and vital signs reviewed.  Appearance: No acute distress.  Well-appearing, nontoxic  Eyes: + conjunctival injection left eye.  Extraocular muscles are intact. Pupils are equal, round, reactive to light. No photophobia.  ENT: Oropharynx clear. No tonsillar exudate or swelling. Uvula midline and normal. Diffuse erythema to posterior pharynx. No elevation of posterior oropharynx.  MM are dry.   Lymph: No cervical lymphadenopathy.   Chest/ Respiratory: Clear to auscultation bilaterally.  Good air movement.  No wheezes.  No rhonchi. No rales. No accessory muscle use.  Cardiovascular:  Regular rate and rhythm.  No murmurs. No gallops. No rubs.  Abdomen:  Soft, no tenderness to palpation, no masses, no guarding, no rebound.  Musculoskeletal: Neck supple.  No meningismus. Diffuse tenderness along cervical paraspinal and trapezius muscles bilaterally, no midline tenderness, step offs, crepitus upon palpation of spine.   Skin: No rashes seen.  Good turgor.  No abrasions.  No ecchymoses.  Neuro: alert and oriented x3,  no focal neurological deficits.  Psych: Appropriate, conversant    Initial MDM:  47-year-old healthy female presenting for evaluation of sore throat, cough, headache times 3 days.  Patient reports multiple family members with similar symptoms.  Has been treating at home with [place], Tylenol, TheraFlu with moderate improvement.  Patient with reported nausea but no vomiting or diarrhea.  Does admit to decreased p.o. intake.  Slightly tachy on arrival at pulse of 112, we will give patient L of fluid to drink and rechecked vitals.  COVID, flu, strep negative.  Given reported shortness of breath and lightheadedness, we will obtain chest x-ray to evaluate for pneumonia.    Labs that " have been ordered have been independently reviewed and interpreted by myself.  Labs Reviewed   GROUP A STREP, MOLECULAR   SARS-COV-2 RDRP GENE   POCT INFLUENZA A/B MOLECULAR       X-Ray Chest AP Portable   Final Result      No acute chest disease identified.         Electronically signed by: Fantasma Bailey MD   Date:    06/17/2023   Time:    13:30            Initial Impression/ Differential Dx:  Differential Diagnosis includes, but is not limited to:  meningitis, nasal foreign body, otitis media/external, bacterial sinusitis, allergic rhinitis, influenza, bacterial/viral pharyngitis, bacterial/viral pneumonia, covid-19      MDM:      ED Course as of 06/17/23 1830   Sat Jun 17, 2023   1334 Chest x-ray negative for any consolidation, no indication of pneumonia. [SW]   1334 Visual acuity intact, 20/20 right eye, 20/25 left eye. [SW]      ED Course User Index  [SW] Lesa Petit PA-C     Reassuring workup.  On repeat vitals, patient remained afebrile, pulse improved to 94.  Patient is requesting to go home so that she can lay down in her own bed.  Suspect patient has the same viral illness the rest of her family suffering from.  No concerning exam findings to indicate need for further labs or workup today.  We will prescribe symptomatic treatment for viral URI.  Exam findings for left eye consistent with a viral conjunctivitis, we will prescribe Pataday and give strict return precautions for development of any purulent drainage or vision changes.  Patient verbalized understanding.  Recommended follow up with PCP within 1 week.  I discussed this case with my attending, Dr. Elizabeth, who is in agreement with plan.    Diagnostic Impression:    1. Viral URI with cough    2. Cough    3. Conjunctivitis of left eye, unspecified conjunctivitis type         ED Disposition Condition    Discharge Stable            ED Prescriptions       Medication Sig Dispense Start Date End Date Auth. Provider    olopatadine (PATANOL) 0.1 %  ophthalmic solution Place 1 drop into the left eye 2 (two) times daily. 5 mL 6/17/2023 6/16/2024 Lesa Petit PA-C    dextromethorphan-guaiFENesin  mg (MUCINEX DM)  mg per 12 hr tablet Take 1 tablet by mouth 2 (two) times daily as needed (for congestion). 20 tablet 6/17/2023 6/27/2023 Lesa Petit PA-C    benzonatate (TESSALON) 200 MG capsule Take 1 capsule (200 mg total) by mouth 3 (three) times daily as needed for Cough. 20 capsule 6/17/2023 6/27/2023 Lesa Petit PA-C    ondansetron (ZOFRAN-ODT) 4 MG TbDL Take 1 tablet (4 mg total) by mouth every 6 (six) hours as needed (for nausea). 20 tablet 6/17/2023 -- Lesa Petit PA-C          Follow-up Information       Follow up With Specialties Details Why Contact Info    Your PCP  Schedule an appointment as soon as possible for a visit in 1 week      [facility] operated [facility] - Emergency Dept Emergency Medicine Go to  If symptoms worsen [address]  [facility] [address] [phone]                 Lesa Petit PA-C  06/17/23 5914

## 2023-06-17 NOTE — DISCHARGE INSTRUCTIONS
You were seen in the ER for evaluation of sore throat.  I believe that you have a viral upper respiratory infection.  I am prescribing you Mucinex DM for congestion, Tessalon for cough, and Zofran for nausea.  You may take Tylenol and ibuprofen over-the-counter as needed for headache, body aches, sore throat.  You may gargle with warm salt water to aid in sore throat.  I also believe that you have a viral conjunctivitis of your eye.  I am prescribing you Pataday drops that you may place in your left eye twice a day for symptom relief.  If you began having thick, pus like discharge from this eye, return to the ER or to your primary care provider. Follow up with your PCP next week, and you may return to the ER for any new or worsening symptoms.

## 2023-06-17 NOTE — FIRST PROVIDER EVALUATION
Emergency Department TeleTriage Encounter Note      CHIEF COMPLAINT    Chief Complaint   Patient presents with    Sore Throat     Reports sore throat, watery eyes, and cough x 2 days. Reports subjective fevers.        VITAL SIGNS   Initial Vitals [06/17/23 1159]   BP Pulse Resp Temp SpO2   114/77 (!) 112 20 98.6 °F (37 °C) 97 %      MAP       --            ALLERGIES    Review of patient's allergies indicates:  No Known Allergies    PROVIDER TRIAGE NOTE  Patient presents with fever >100 degrees, sore throat, cough and watery eyes. Grandchildren have been sick.       ORDERS  Labs Reviewed - No data to display    ED Orders (720h ago, onward)      None              Virtual Visit Note: The provider triage portion of this emergency department evaluation and documentation was performed via [employer], a HIPAA-compliant telemedicine application, in concert with a tele-presenter in the room. A face to face patient evaluation with one of my colleagues will occur once the patient is placed in an emergency department room.      DISCLAIMER: This note was prepared with [employer] voice recognition transcription software. Garbled syntax, mangled pronouns, and other bizarre constructions may be attributed to that software system.

## 2024-04-25 ENCOUNTER — HOSPITAL ENCOUNTER (EMERGENCY)
Facility: OTHER | Age: 49
Discharge: HOME | End: 2024-04-25
Attending: EMERGENCY MEDICINE
Payer: MEDICAID

## 2024-04-25 VITALS
WEIGHT: 241 LBS | OXYGEN SATURATION: 97 % | SYSTOLIC BLOOD PRESSURE: 140 MMHG | HEIGHT: 61 IN | TEMPERATURE: 100 F | BODY MASS INDEX: 45.5 KG/M2 | RESPIRATION RATE: 18 BRPM | DIASTOLIC BLOOD PRESSURE: 82 MMHG | HEART RATE: 110 BPM

## 2024-04-25 DIAGNOSIS — J06.9 VIRAL URI WITH COUGH: Primary | ICD-10-CM

## 2024-04-25 LAB
B-HCG UR QL: NEGATIVE
CTP QC/QA: YES
POC MOLECULAR INFLUENZA A AGN: NEGATIVE
POC MOLECULAR INFLUENZA B AGN: NEGATIVE
SARS-COV-2 RDRP RESP QL NAA+PROBE: NEGATIVE

## 2024-04-25 PROCEDURE — 87635 SARS-COV-2 COVID-19 AMP PRB: CPT | Performed by: EMERGENCY MEDICINE

## 2024-04-25 PROCEDURE — 99284 EMERGENCY DEPT VISIT MOD MDM: CPT

## 2024-04-25 PROCEDURE — 81025 URINE PREGNANCY TEST: CPT | Performed by: EMERGENCY MEDICINE

## 2024-04-25 RX ORDER — ONDANSETRON 4 MG/1
4 TABLET, ORALLY DISINTEGRATING ORAL EVERY 6 HOURS PRN
Qty: 10 TABLET | Refills: 0 | Status: SHIPPED | OUTPATIENT
Start: 2024-04-25 | End: 2024-05-02

## 2024-04-25 RX ORDER — BENZONATATE 100 MG/1
100 CAPSULE ORAL 3 TIMES DAILY PRN
Qty: 20 CAPSULE | Refills: 0 | Status: SHIPPED | OUTPATIENT
Start: 2024-04-25 | End: 2024-05-05

## 2024-04-25 NOTE — ED PROVIDER NOTES
"     Source of History:  The patient    Chief complaint:  bodyaches (Headache, bodyache, cough and runny nose for the past two days not relieved with otc meds. )      HPI:  Alexia Fraire is a 48 y.o. female who complains of 2 days of body aches, chills, cough runny nose.  Her granddaughter had similar symptoms last week.  Denies any shortness of breath or chest pain.  Has taken Tylenol as well as TheraFlu over-the-counter.    This is the extent to the patients complaints today here in the emergency department.    ROS:   See HPI    Review of patient's allergies indicates:  No Known Allergies    PMH:  As per HPI and below:  No past medical history on file.  Past Surgical History:   Procedure Laterality Date     SECTION, LOW TRANSVERSE         Social History     Tobacco Use    Smoking status: Never    Smokeless tobacco: Never   Substance Use Topics    Alcohol use: No    Drug use: No       Physical Exam:    BP (!) 140/82 (BP Location: Left arm, Patient Position: Sitting)   Pulse (!) 126   Temp 100.2 °F (37.9 °C) (Oral)   Resp 18   Ht 5' 1" (1.549 m)   Wt 109.3 kg (241 lb)   SpO2 97%   BMI 45.54 kg/m²   Nursing note and vital signs reviewed.  Constitutional: No acute distress.  Nontoxic  Eyes: No conjunctival injection.  Extraocular muscles are intact.  ENT: Oropharynx clear. No tonisillar exudate or swelling. Uvula midline and normal. No elevation of posterior oropharynx.  Nasal congestion with mucosal edema  Cardiovascular: Regular rate and rhythm.  No murmurs. No gallops. No rubs.  Respiratory: Clear to auscultation bilaterally.  Good air movement.  No wheezes.  No rhonchi. No rales. No accessory muscle use.  Musculoskeletal: Neck supple.  No meningismus.  Skin: No rashes seen.  Good turgor.  No abrasions.  No ecchymoses.      MDM/ Differential Dx:  Patient has signs and symptoms viral upper respiratory infection/viral syndrome.  Possibility of COVID or influenza.  I have considered but do " not suspect pneumonia I do not feel imaging is indicated.  The patient's vital signs are otherwise are stable and do not feel any further workup is indicated.  The patient does not appear to be septic and no evidence of peritonsillar abscess or retropharyngeal abscess.  Also consider but do not suspect strep pharyngitis or mononucleosis.  Will treat with symptomatic care as an outpatient.    ED Course as of 04/25/24 0811   Thu Apr 25, 2024   0757 hCG Qualitative, Urine: Negative [SM]   0757 SARS-CoV-2 RNA, Amplification, Qual: Negative [SM]   0757 POC Molecular Influenza A Ag: Negative [SM]   0757 POC Molecular Influenza B Ag: Negative [SM]   0810 No further workup is indicated in the emergency department today.  I updated pt regarding results and I counseled pt regarding supportive care measures.  Diagnosis and treatment plan explained to patient. I have answered all questions and the patient is satisfied with the plan of care. Patient discharged home in stable condition.  [SM]      ED Course User Index  [SM] Dean Yañez DO                 Diagnostic Impression:    1. Viral URI with cough                  Dean Yañez,   04/25/24 0811

## 2024-04-25 NOTE — DISCHARGE INSTRUCTIONS
May also use Afrin (oxymetazoline) over-the-counter.  The store brand generic is just as good and half the price of the brand name.  Use before bedtime.  Use no more than 3 days.    Would also recommend using over-the-counter Zyrtec to take 1 pill daily for 10-14 days.

## 2025-02-20 ENCOUNTER — HOSPITAL ENCOUNTER (EMERGENCY)
Facility: OTHER | Age: 50
Discharge: HOME | End: 2025-02-20
Attending: EMERGENCY MEDICINE
Payer: MEDICAID

## 2025-02-20 VITALS
HEIGHT: 62 IN | BODY MASS INDEX: 44.16 KG/M2 | HEART RATE: 77 BPM | OXYGEN SATURATION: 100 % | SYSTOLIC BLOOD PRESSURE: 143 MMHG | DIASTOLIC BLOOD PRESSURE: 81 MMHG | TEMPERATURE: 98 F | RESPIRATION RATE: 16 BRPM | WEIGHT: 240 LBS

## 2025-02-20 DIAGNOSIS — R09.82 POSTNASAL DRIP: ICD-10-CM

## 2025-02-20 DIAGNOSIS — J01.40 SUBACUTE PANSINUSITIS: Primary | ICD-10-CM

## 2025-02-20 LAB
CTP QC/QA: YES
CTP QC/QA: YES
POC MOLECULAR INFLUENZA A AGN: NEGATIVE
POC MOLECULAR INFLUENZA B AGN: NEGATIVE
SARS-COV-2 RDRP RESP QL NAA+PROBE: NEGATIVE

## 2025-02-20 PROCEDURE — 87635 SARS-COV-2 COVID-19 AMP PRB: CPT | Performed by: EMERGENCY MEDICINE

## 2025-02-20 PROCEDURE — 63600175 PHARM REV CODE 636 W HCPCS: Performed by: NURSE PRACTITIONER

## 2025-02-20 PROCEDURE — 99284 EMERGENCY DEPT VISIT MOD MDM: CPT

## 2025-02-20 PROCEDURE — 25000003 PHARM REV CODE 250: Performed by: NURSE PRACTITIONER

## 2025-02-20 RX ORDER — BENZONATATE 100 MG/1
100 CAPSULE ORAL 3 TIMES DAILY PRN
Qty: 30 CAPSULE | Refills: 0 | Status: SHIPPED | OUTPATIENT
Start: 2025-02-20 | End: 2025-03-02

## 2025-02-20 RX ORDER — PREDNISONE 20 MG/1
60 TABLET ORAL
Status: COMPLETED | OUTPATIENT
Start: 2025-02-20 | End: 2025-02-20

## 2025-02-20 RX ORDER — AMOXICILLIN AND CLAVULANATE POTASSIUM 875; 125 MG/1; MG/1
1 TABLET, FILM COATED ORAL 2 TIMES DAILY
Qty: 14 TABLET | Refills: 0 | Status: SHIPPED | OUTPATIENT
Start: 2025-02-20

## 2025-02-20 RX ORDER — AMOXICILLIN AND CLAVULANATE POTASSIUM 875; 125 MG/1; MG/1
1 TABLET, FILM COATED ORAL
Status: COMPLETED | OUTPATIENT
Start: 2025-02-20 | End: 2025-02-20

## 2025-02-20 RX ORDER — IBUPROFEN 600 MG/1
600 TABLET ORAL
Status: COMPLETED | OUTPATIENT
Start: 2025-02-20 | End: 2025-02-20

## 2025-02-20 RX ORDER — PREDNISONE 20 MG/1
40 TABLET ORAL DAILY
Qty: 10 TABLET | Refills: 0 | Status: SHIPPED | OUTPATIENT
Start: 2025-02-20 | End: 2025-02-25

## 2025-02-20 RX ORDER — METHOCARBAMOL 500 MG/1
1000 TABLET, FILM COATED ORAL
Status: COMPLETED | OUTPATIENT
Start: 2025-02-20 | End: 2025-02-20

## 2025-02-20 RX ORDER — PROMETHAZINE HYDROCHLORIDE AND DEXTROMETHORPHAN HYDROBROMIDE 6.25; 15 MG/5ML; MG/5ML
5 SYRUP ORAL NIGHTLY PRN
Qty: 50 ML | Refills: 0 | Status: SHIPPED | OUTPATIENT
Start: 2025-02-20 | End: 2025-03-02

## 2025-02-20 RX ADMIN — IBUPROFEN 600 MG: 600 TABLET, FILM COATED ORAL at 09:02

## 2025-02-20 RX ADMIN — PREDNISONE 60 MG: 20 TABLET ORAL at 09:02

## 2025-02-20 RX ADMIN — AMOXICILLIN AND CLAVULANATE POTASSIUM 1 TABLET: 875; 125 TABLET, FILM COATED ORAL at 09:02

## 2025-02-20 RX ADMIN — METHOCARBAMOL 1000 MG: 500 TABLET ORAL at 09:02

## 2025-02-21 NOTE — ED PROVIDER NOTES
"Source of History:  Patient, chart    Chief complaint:  Influenza (C/o dry cough, headache, stuffy nose, rhinorrhea, itchy throat, nausea, post- nasal gtt x 2 days)      HPI:  Alexia Fraire is a 49 y.o. female presenting with cough, headache, nasal congestion, itchy throat, nausea and postnasal drip for the past 2 days.    This is the extent to the patients complaints today here in the emergency department.    ROS: As per HPI and below:  Constitutional:  No fever.  No chills.  Positive for body aches.  Eyes: No discharge  ENT: Positive for nasal congestion.  Positive for postnasal drip.  Positive for scratchy throat.  Respiratory: No difficulty breathing. Positive for cough  Abdomen: No abdominal pain.   Genito-Urinary: No abnormal urination.  Neurologic: No weakness.  Positive for headache  MSK: no injuries  Integument: No rashes or lesions.  Hematologic: No easy bruising.  Endocrine: No excessive thirst or urination.    Review of patient's allergies indicates:  No Known Allergies    PMH:  As per HPI and below:  History reviewed. No pertinent past medical history.  Past Surgical History:   Procedure Laterality Date     SECTION, LOW TRANSVERSE         Social History[1]    Physical Exam:    BP (!) 153/77 (BP Location: Left arm)   Pulse 84   Temp 97.4 °F (36.3 °C) (Oral)   Resp 15   Ht 5' 2" (1.575 m)   Wt 108.9 kg (240 lb)   SpO2 100%   Breastfeeding No   BMI 43.90 kg/m²   Nursing note and vital signs reviewed.  Constitutional: No acute distress.  Nonfebrile and nontoxic appearing..  Eyes: No conjunctival injection.  Moist eyes with good tear production.  Extraocular muscles are intact.  ENT:  Clear nasal discharge with mucosal edema noted.  Frontal and maxillary sinus tenderness to palpation.  Oropharynx clear.  Moist mucous membranes.  Tonsils 2+ with no exudate, not kissing, no asymmetry, uvula midline, mild- moderate erythema    Cardiovascular: Regular rate and rhythm. No murmurs, " gallops or rubs.  Respiratory: Clear to auscultation bilaterally.  Good air movement.  No wheezes.  No rhonchi. No rales. No accessory muscle use.  Abdomen: Soft.  Not distended.  Nontender.  No guarding.  No rebound. Non-peritoneal.  Musculoskeletal: Good range of motion all joints.  No deformities.  Neck supple.  No meningismus.  Skin: No rashes seen.  Good turgor.  No abrasions.  No ecchymoses.  Neurologic: Motor intact and moving all extremities.  No focal neurological deficits  Mental Status:  Alert.  Appropriate for age.    MDM    Emergent evaluation of a 48 yo female presenting for headache, postnasal drip, scratchy throat, nasal congestion and cough.  Patient has been taking over-the-counter medications at home with minimal relief.  Patient states sinus congestion and headache has been ongoing for the past week but symptoms worsened 2 days ago.  On exam pt is A&Ox3. VSS. Nonfebrile and nontoxic appearing.  Clear nasal discharge with mucosal edema noted.  Frontal and maxillary sinus tenderness to palpation.  Oropharynx clear.  Moist mucous membranes.  Tonsils 2+ with no exudate, not kissing, no asymmetry, uvula midline, mild- moderate erythema. Breath sounds clear bilaterally.  BS WNL.  Pt speaking in full sentences.  Steady gait appreciated. Cap refill < 3 seconds.      History Acquisition   Additional history was acquired from other historians.  Chart, family    The patient's list of active medical problems, social history, medications, and allergies as documented per RN staff has been reviewed.     Differential Diagnoses   Based on available information and the initial assessment, the working differential diagnoses considered during this evaluation include but are not limited to COVID, influenza, viral URI, sinusitis, allergic rhinitis, others.    I will get swabs, medicate and reassess.      LABS     Labs Reviewed   SARS-COV-2 RDRP GENE       Result Value    POC Rapid COVID Negative        Acceptable Yes     POCT INFLUENZA A/B MOLECULAR    POC Molecular Influenza A Ag Negative      POC Molecular Influenza B Ag Negative       Acceptable Yes       Additional Consideration   All available testing was considered during the course of this workup.  Comorbidities taken into consideration during the patient's evaluation and treatment include weight, age.    Social determinants of health were taken into consideration during development of our treatment plan.    Medications   amoxicillin-clavulanate 875-125mg per tablet 1 tablet (has no administration in time range)   predniSONE tablet 60 mg (has no administration in time range)   ibuprofen tablet 600 mg (600 mg Oral Given 2/20/25 2135)   methocarbamoL tablet 1,000 mg (1,000 mg Oral Given 2/20/25 2135)      ED Course as of 02/20/25 2140   Thu Feb 20, 2025 2135 COVID and influenza negative.  Patient reassessed.  Patient advised that we will treat for subacute sinusitis.  Advised to take antibiotics as prescribed.  Short dose of steroids to help with inflammation.  Advised to take over the decongestants as needed.  Tessalon and promethazine prescribed for cough.  Strict return to ED precautions discussed.  Patient verbalized understanding of this plan of care.  All questions and concerns addressed. [RZ]   2140 Patient is hemodynamically stable, vital signs are normal. Discharge instructions given. Return to ED precautions discussed. Follow up as directed. Pt verbalized understanding of this plan.  Pt is stable for discharge.  [RZ]      ED Course User Index  [RZ] Nikia Lemos NP             CLINICAL IMPRESSION  1. Subacute pansinusitis    2. Postnasal drip         ED Disposition Condition    Discharge Stable            Instruction:  I see no indication of an emergent process beyond that addressed during our encounter but have duly counseled the patient/family regarding the need for prompt follow-up as well as the indications that should  prompt immediate return to the emergency room should new or worrisome developments occur.  The patient/family has been provided with verbal and printed direction regarding our final diagnosis(es) as well as instructions regarding use of OTC and/or Rx medications intended to manage the patient's aforementioned conditions including:  ED Prescriptions       Medication Sig Dispense Start Date End Date Auth. Provider    amoxicillin-clavulanate 875-125mg (AUGMENTIN) 875-125 mg per tablet Take 1 tablet by mouth 2 (two) times daily. 14 tablet 2/20/2025 -- Nikia Lemos NP    predniSONE (DELTASONE) 20 MG tablet Take 2 tablets (40 mg total) by mouth once daily. for 5 days 10 tablet 2/20/2025 2/25/2025 Nikia Lemos NP    promethazine-dextromethorphan (PROMETHAZINE-DM) 6.25-15 mg/5 mL Syrp Take 5 mLs by mouth nightly as needed. 50 mL 2/20/2025 3/2/2025 Nikia Lemos NP    benzonatate (TESSALON) 100 MG capsule Take 1 capsule (100 mg total) by mouth 3 (three) times daily as needed for Cough. 30 capsule 2/20/2025 3/2/2025 Nikia Lemos NP          Patient has been advised of following recommended follow-up instructions:  Follow-up Information       Follow up With Specialties Details Why Contact Info    PCP  Schedule an appointment as soon as possible for a visit  As needed           The patient/family communicates understanding of all this information and all remaining questions and concerns were addressed at this time.      The patient's condition did not warrant review of the  and prescription of controlled substances.      This note was created using dictation software.  This program may occasionally mistype words and phrases.           [1]   Social History  Tobacco Use    Smoking status: Never    Smokeless tobacco: Never   Substance Use Topics    Alcohol use: No    Drug use: No        Nikia Lemos NP  02/20/25 2779

## 2025-02-21 NOTE — ED NOTES
Pt c/o flu- like symptoms x 2 days, cough, headache, post-nasal gtt, nausea, stuffy nose, rhinorrhea and itchy throat. Pt is A & O x 3, denies SOB, fever, chills and N/V/D. No obvious respiratory distress noted. Respirations are even and unlabored. Skin is warm and dry w/ pink mucosa. PAULINE x 3mm. BBS- CTA . Abd- SNT. PSM x 4 exts. Bed is locked, in the low position and locked for safety. Call bell @ BS. Will continue to monitor closely.

## 2025-02-21 NOTE — ED TRIAGE NOTES
Alexia Fraire, a 49 y.o. female presents to the ED w/ complaint of headache/congestion/nausea with dry cough    Triage note:  Chief Complaint   Patient presents with    Influenza     C/o dry cough, headache, stuffy nose, rhinorrhea, itchy throat, nausea, post- nasal gtt x 2 days     Review of patient's allergies indicates:  No Known Allergies  No past medical history on file.

## 2025-02-21 NOTE — DISCHARGE INSTRUCTIONS
You were seen and evaluated in the ER today.  Your COVID and influenza were negative.  We are going to treat you for acute sinusitis due to your symptoms ongoing for a week.  Please take medications as prescribed.  You can take over-the-counter decongestants as needed.  Please follow-up with your PCP as needed.  Please return to the ED for any worsening symptoms such as chest pain, shortness of breath, fever not controlled with Tylenol or ibuprofen or uncontrolled pain.      Our goal in the emergency department is to always give you outstanding care and exceptional service. You may receive a survey by mail or e-mail in the next week regarding your experience in our ED. We would greatly appreciate your completing and returning the survey. Your feedback provides us with a way to recognize our staff who give very good care and it helps us learn how to improve when your experience was below our aspiration of excellence.